# Patient Record
Sex: MALE | Race: WHITE | Employment: UNEMPLOYED | URBAN - METROPOLITAN AREA
[De-identification: names, ages, dates, MRNs, and addresses within clinical notes are randomized per-mention and may not be internally consistent; named-entity substitution may affect disease eponyms.]

---

## 2021-10-19 ENCOUNTER — TELEPHONE (OUTPATIENT)
Dept: FAMILY MEDICINE CLINIC | Age: 41
End: 2021-10-19

## 2021-10-19 NOTE — TELEPHONE ENCOUNTER
----- Message from Serenity Hubbard sent at 10/18/2021  6:17 PM EDT -----  Regarding: New Patient Appointment Request  Loc Goddard needs to be scheduled for a new patient appointment with Dr. Marty Villagomez. He is also experiencing vertigo, weakness, ear popping/ringing and other lingering covid symptoms after having it. He had laryngitis so his throat is still sore but he tested negative for covid 10 days ago. We got disconnected during our call, I tried contacting the patient back but it said there was no service and I got no answer, hopefully someone from the office can help. Thanks!

## 2021-10-27 ENCOUNTER — OFFICE VISIT (OUTPATIENT)
Dept: FAMILY MEDICINE CLINIC | Age: 41
End: 2021-10-27
Payer: COMMERCIAL

## 2021-10-27 VITALS
SYSTOLIC BLOOD PRESSURE: 128 MMHG | HEART RATE: 91 BPM | WEIGHT: 167 LBS | DIASTOLIC BLOOD PRESSURE: 84 MMHG | TEMPERATURE: 97.6 F | HEIGHT: 69 IN | OXYGEN SATURATION: 98 % | BODY MASS INDEX: 24.73 KG/M2

## 2021-10-27 DIAGNOSIS — R11.2 NAUSEA AND VOMITING, INTRACTABILITY OF VOMITING NOT SPECIFIED, UNSPECIFIED VOMITING TYPE: ICD-10-CM

## 2021-10-27 DIAGNOSIS — H93.13 TINNITUS OF BOTH EARS: ICD-10-CM

## 2021-10-27 DIAGNOSIS — R42 DIZZINESS: Primary | ICD-10-CM

## 2021-10-27 DIAGNOSIS — F32.A DEPRESSION, UNSPECIFIED DEPRESSION TYPE: ICD-10-CM

## 2021-10-27 DIAGNOSIS — F41.9 ANXIETY: ICD-10-CM

## 2021-10-27 DIAGNOSIS — Q61.3 POLYCYSTIC KIDNEY DISEASE: ICD-10-CM

## 2021-10-27 PROCEDURE — 99204 OFFICE O/P NEW MOD 45 MIN: CPT | Performed by: NURSE PRACTITIONER

## 2021-10-27 RX ORDER — PREDNISONE 20 MG/1
TABLET ORAL
COMMUNITY
Start: 2021-10-13 | End: 2021-12-29

## 2021-10-27 RX ORDER — MAGNESIUM GLUCONATE 27 MG(500)
TABLET ORAL
COMMUNITY
Start: 2021-09-09 | End: 2022-09-07 | Stop reason: ALTCHOICE

## 2021-10-27 RX ORDER — PSEUDOEPHED/ACETAMINOPH/DIPHEN 30MG-500MG
TABLET ORAL
COMMUNITY
Start: 2021-10-22 | End: 2021-12-29

## 2021-10-27 RX ORDER — FLUTICASONE PROPIONATE 50 MCG
SPRAY, SUSPENSION (ML) NASAL
COMMUNITY
Start: 2021-09-09 | End: 2021-10-27

## 2021-10-27 RX ORDER — FLUTICASONE PROPIONATE 50 MCG
1 SPRAY, SUSPENSION (ML) NASAL DAILY
Qty: 16 G | Refills: 0 | Status: SHIPPED | OUTPATIENT
Start: 2021-10-27 | End: 2021-11-10 | Stop reason: SDUPTHER

## 2021-10-27 RX ORDER — IBUPROFEN 400 MG/1
TABLET ORAL
COMMUNITY
Start: 2021-10-22 | End: 2021-12-29

## 2021-10-27 RX ORDER — MECLIZINE HYDROCHLORIDE 25 MG/1
TABLET ORAL
COMMUNITY
Start: 2021-10-22 | End: 2021-11-09 | Stop reason: SDUPTHER

## 2021-10-27 RX ORDER — LORAZEPAM 0.5 MG/1
TABLET ORAL
COMMUNITY
Start: 2021-10-06 | End: 2021-12-29

## 2021-10-27 RX ORDER — AMOXICILLIN AND CLAVULANATE POTASSIUM 875; 125 MG/1; MG/1
TABLET, FILM COATED ORAL
COMMUNITY
Start: 2021-10-13 | End: 2021-12-01 | Stop reason: ALTCHOICE

## 2021-10-27 RX ORDER — DICYCLOMINE HCL 20 MG
TABLET ORAL
COMMUNITY
Start: 2021-08-26 | End: 2022-09-07 | Stop reason: ALTCHOICE

## 2021-10-27 RX ORDER — CEFDINIR 300 MG/1
CAPSULE ORAL
COMMUNITY
Start: 2021-09-24 | End: 2021-12-01 | Stop reason: ALTCHOICE

## 2021-10-27 RX ORDER — ONDANSETRON 4 MG/1
TABLET, ORALLY DISINTEGRATING ORAL
COMMUNITY
Start: 2021-08-26 | End: 2021-12-01 | Stop reason: ALTCHOICE

## 2021-10-27 SDOH — ECONOMIC STABILITY: FOOD INSECURITY: WITHIN THE PAST 12 MONTHS, THE FOOD YOU BOUGHT JUST DIDN'T LAST AND YOU DIDN'T HAVE MONEY TO GET MORE.: NEVER TRUE

## 2021-10-27 SDOH — ECONOMIC STABILITY: FOOD INSECURITY: WITHIN THE PAST 12 MONTHS, YOU WORRIED THAT YOUR FOOD WOULD RUN OUT BEFORE YOU GOT MONEY TO BUY MORE.: NEVER TRUE

## 2021-10-27 ASSESSMENT — ENCOUNTER SYMPTOMS
RHINORRHEA: 0
NAUSEA: 0
COUGH: 0
BACK PAIN: 0
SHORTNESS OF BREATH: 0
ABDOMINAL PAIN: 0
SORE THROAT: 0
DIARRHEA: 0
VOMITING: 0
WHEEZING: 0

## 2021-10-27 ASSESSMENT — PATIENT HEALTH QUESTIONNAIRE - PHQ9
5. POOR APPETITE OR OVEREATING: 0
SUM OF ALL RESPONSES TO PHQ QUESTIONS 1-9: 19
3. TROUBLE FALLING OR STAYING ASLEEP: 3
8. MOVING OR SPEAKING SO SLOWLY THAT OTHER PEOPLE COULD HAVE NOTICED. OR THE OPPOSITE, BEING SO FIGETY OR RESTLESS THAT YOU HAVE BEEN MOVING AROUND A LOT MORE THAN USUAL: 0
7. TROUBLE CONCENTRATING ON THINGS, SUCH AS READING THE NEWSPAPER OR WATCHING TELEVISION: 2
10. IF YOU CHECKED OFF ANY PROBLEMS, HOW DIFFICULT HAVE THESE PROBLEMS MADE IT FOR YOU TO DO YOUR WORK, TAKE CARE OF THINGS AT HOME, OR GET ALONG WITH OTHER PEOPLE: 2
6. FEELING BAD ABOUT YOURSELF - OR THAT YOU ARE A FAILURE OR HAVE LET YOURSELF OR YOUR FAMILY DOWN: 3
4. FEELING TIRED OR HAVING LITTLE ENERGY: 3
2. FEELING DOWN, DEPRESSED OR HOPELESS: 3
1. LITTLE INTEREST OR PLEASURE IN DOING THINGS: 3
SUM OF ALL RESPONSES TO PHQ9 QUESTIONS 1 & 2: 6
SUM OF ALL RESPONSES TO PHQ QUESTIONS 1-9: 19
SUM OF ALL RESPONSES TO PHQ QUESTIONS 1-9: 17
9. THOUGHTS THAT YOU WOULD BE BETTER OFF DEAD, OR OF HURTING YOURSELF: 2

## 2021-10-27 ASSESSMENT — COLUMBIA-SUICIDE SEVERITY RATING SCALE - C-SSRS
3. HAVE YOU BEEN THINKING ABOUT HOW YOU MIGHT KILL YOURSELF?: YES
6. HAVE YOU EVER DONE ANYTHING, STARTED TO DO ANYTHING, OR PREPARED TO DO ANYTHING TO END YOUR LIFE?: YES
1. WITHIN THE PAST MONTH, HAVE YOU WISHED YOU WERE DEAD OR WISHED YOU COULD GO TO SLEEP AND NOT WAKE UP?: YES
2. HAVE YOU ACTUALLY HAD ANY THOUGHTS OF KILLING YOURSELF?: YES
5. HAVE YOU STARTED TO WORK OUT OR WORKED OUT THE DETAILS OF HOW TO KILL YOURSELF? DO YOU INTEND TO CARRY OUT THIS PLAN?: NO
7. DID THIS OCCUR IN THE LAST THREE MONTHS: NO
4. HAVE YOU HAD THESE THOUGHTS AND HAD SOME INTENTION OF ACTING ON THEM?: YES

## 2021-10-27 ASSESSMENT — SOCIAL DETERMINANTS OF HEALTH (SDOH): HOW HARD IS IT FOR YOU TO PAY FOR THE VERY BASICS LIKE FOOD, HOUSING, MEDICAL CARE, AND HEATING?: SOMEWHAT HARD

## 2021-10-27 NOTE — PATIENT INSTRUCTIONS
Food and Meal Resources    Leodis Senate of 2175 [facility]  Call 211 or  www. Virdia    [employer]. 50 [facility])  Call - [phone]  [email or web address]. Lesson[employer]      [employer] / [employer] on [facility]  [address]  [phone]  *regular & [address]. 1925 [facility], [address]  [phone]  *regular & special diets  60+ [facility] on [address] [place]  [phone], ext. 111 [facility] on 9786 [facility]. [facility], [address] [place] [address]  [phone]  *regular & special diets  [address], [address] and [place] only    51 [facility] on [address] [place] [address]. #[address]  [place], [address] [facility] [address]  [address]  [phone]  [facility] 109 only    [address] on [address]. [place], [address] [facility] [address]  [phone]  61 + and/or disables      Food and Meal Resources    Leodis Senate of [facility] or  www. Virdia    [facility]. 50 [facility])  Call - [phone]  [email or web address]. [employer]      [employer] / [employer] on [facility]  [address]  [phone]  *regular & [address]. 1925 [facility], [address]  [phone]  *regular & special diets  60+ [facility] on [address] [place]  [phone], ext. 111 [facility] on [address] [place] [address].   [place], [address]  [phone]  *regular & special diets  [place], [facility], [place] and [place] only    [facility] on [address] [facility]. #[address]  [place], [address]  401 [facility]. 100 [facility], [address] [place] [address] Road  [phone]  [address] 109 only    [address] on [address]. [place]. [place], [address]  [phone]  61 + and/or disables      Family and Housing Resources    [address] [place] [address] Drive of [address] [place] [address]  Call 211  or - www. [facility]cast[facility] Center[facility] [address] [place] [address])  Call - [phone]  www."[email or web address]"    [address]  [facility], [address] [place] [address], [address] [place] [address]  [phone] /[phone]    Medicaid Application  [email or web address] [email or web address]  [phone]    Home Energy Assistance Programs (HEAP)  [facility] Sendy Carrillos. [facility], 1001 [facility]  [phone]    [facility] ( jail)  [address] [facility] [address], 1850 [facility] (jail)  [address], [address]  [address] [facility]  www. Vartopia    [employer]  [address] [place] [address], [address]  [address] [facility] [employer]  [address] Ven[address] [address] 3735 [facility] at 3[address] [facility]

## 2021-10-27 NOTE — PROGRESS NOTES
[address] [facility] [address] [facility]  [address] [place] [address]  Dept: [phone]  Dept Fax: [phone]  Loc: [phone]     Subjective     Carey Spencer 37 y.o. male presents 10/27/21 with   Chief Complaint   Patient presents with    New Patient    [facility] from [facility], vertigo, cysts on kidneys and lungs, malabsorption, blood in stool    Depression     POSITIVE SCREENING       HPI     Patient presents to establish care, states he needs a PCP. Admits he was recently diagnosed with vertigo. Dizziness, has constant ringing in his ears, pressure in his head. Diagnosed in August. Wakes up dizzy, fatigued, light hurts his eyes, ears ringing, head feels like it is being squeezed. Was given Meclizine this helps slightly, still with ringing and pressure, also with some blurry vision. Has been tested for COVID several times. Was set up to see ENT, but for some reason this did not happen. Also with some GI issues, nauseas and vomiting, has been taking bentyl, magnesium, and zofran which has been helping him. Does have anxiety and depression, no active SI/HI, these thoughts cross his mind occasionally, smokes marijuana occasionally. States things got really bad when COVID hit, lost his job, lost the house, lost fiance, son moved out. Would like to get his mental health right. Had occasional panic attacks but not often. Patient now works as a  (which is a source of stress), but has not been able to due to vertigo. Thinks this job may give him an aspect of PTSD. Also had a rough childhood, thinks he might have PTSD from this. Has a counseling appt set up with [facility]. Was on Augmentin a week ago for sore throat. States he passed out at work once, felt lightheaded and dizzy. No other drugs or alcohol.     Reviewed the following history:    Past Medical History:   Diagnosis Date    Vertigo History reviewed. No pertinent surgical history. Family History   Problem Relation Age of Onset    Alcohol Abuse Father        Allergies   Allergen Reactions    Penicillins Anaphylaxis and Nausea And Vomiting       Current Outpatient Medications on File Prior to Visit   Medication Sig Dispense Refill    meclizine (ANTIVERT) 25 MG tablet take 1 tablet by mouth three times a day      dicyclomine (BENTYL) 20 MG tablet take 1 tablet by mouth four times a day if needed      polyethylene glycol (GOLYTELY) 236 g solution Take by mouth      cefdinir (OMNICEF) 300 MG capsule Take by mouth      ACETAMINOPHEN EXTRA STRENGTH 500 MG tablet take 2 tablets by mouth three times a day (Patient not taking: Reported on 10/27/2021)      ondansetron (ZOFRAN-ODT) 4 MG disintegrating tablet dissolve 1 tablet ON TONGUE every 6 hours if needed (Patient not taking: Reported on 10/27/2021)      magnesium gluconate (MAGONATE) 500 MG tablet Take by mouth (Patient not taking: Reported on 10/27/2021)      predniSONE (DELTASONE) 20 MG tablet take 3 tablets by mouth daily for 3 days then 2 tablets daily for. ..  (REFER TO PRESCRIPTION NOTES). (Patient not taking: Reported on 10/27/2021)      LORazepam (ATIVAN) 0.5 MG tablet take 1 tablet by mouth three times a day if needed for anxiety (Patient not taking: Reported on 10/27/2021)      ibuprofen (ADVIL;MOTRIN) 400 MG tablet take 1 tablet by mouth three times a day (Patient not taking: Reported on 10/27/2021)      amoxicillin-clavulanate (AUGMENTIN) 875-125 MG per tablet take 1 tablet by mouth twice a day for 10 days       No current facility-administered medications on file prior to visit. Review of Systems   Constitutional: Negative for chills and fever. HENT: Negative for congestion, rhinorrhea and sore throat. Head pressure and ringing in ears   Respiratory: Negative for cough, shortness of breath and wheezing. Cardiovascular: Negative for chest pain. Gastrointestinal: Negative for abdominal pain, diarrhea, nausea and vomiting. Musculoskeletal: Negative for back pain and myalgias. Skin: Negative for rash. Neurological: Positive for dizziness. Psychiatric/Behavioral: The patient is nervous/anxious. Objective    Vitals:    10/27/21 1025   BP: 128/84   Pulse: 91   Temp: 97.6 °F (36.4 °C)   TempSrc: Infrared   SpO2: 98%   Weight: 167 lb (75.8 kg)   Height: 5' 9\" (1.753 m)       Physical Exam  Constitutional:       General: He is not in acute distress. Appearance: Normal appearance. He is not ill-appearing or toxic-appearing. HENT:      Head: Normocephalic and atraumatic. Right Ear: Hearing and external ear normal.      Left Ear: Hearing and external ear normal.   Eyes:      General: Lids are normal.      Conjunctiva/sclera: Conjunctivae normal.      Pupils: Pupils are equal, round, and reactive to light. Cardiovascular:      Rate and Rhythm: Normal rate and regular rhythm. Heart sounds: Normal heart sounds. Pulmonary:      Effort: Pulmonary effort is normal. No respiratory distress. Breath sounds: Normal breath sounds. No decreased breath sounds, wheezing, rhonchi or rales. Musculoskeletal:      Cervical back: Normal range of motion and neck supple. Skin:     General: Skin is warm and dry. Neurological:      General: No focal deficit present. Mental Status: He is alert and oriented to person, place, and time. Cranial Nerves: Cranial nerves are intact. Sensory: Sensation is intact. Motor: Motor function is intact. Coordination: Coordination is intact. Gait: Gait is intact. Psychiatric:         Attention and Perception: Attention normal.         Mood and Affect: Mood normal.         Speech: Speech normal.         Behavior: Behavior normal.         Thought Content:  Thought content normal.         Cognition and Memory: Cognition normal.         Judgment: Judgment normal.       POC Testing Today: No results found for this visit on 10/27/21. Assessment and Plan    Cedric Diamond 37 y.o. male presenting for establish care, dizziness, anxiety/depression    1. Dizziness  - Patient states he was diagnosed with vertigo recently. - Ambulatory referral to Neurology  - Ambulatory referral to Physical Therapy    2. Tinnitus of both ears  - Ambulatory referral to Neurology  - fluticasone (FLONASE) 50 MCG/ACT nasal spray; 1 spray by Each Nostril route daily  Dispense: 16 g; Refill: 0    3. Depression, unspecified depression type  - sertraline (ZOLOFT) 50 MG tablet; Take 1 tablet by mouth daily  Dispense: 30 tablet; Refill: 0    4. Anxiety  - sertraline (ZOLOFT) 50 MG tablet; Take 1 tablet by mouth daily  Dispense: 30 tablet; Refill: 0    5. Polycystic kidney disease  - Amb External Referral To Nephrology    6. Nausea and vomiting, intractability of vomiting not specified, unspecified vomiting type  - Ambulatory referral to Gastroenterology        Return in about 4 weeks (around 11/24/2021) for follow up, PRN for any acute conditions. Side effects and adverse effects of any medication prescribed today, as well as treatment plan/rationale, follow-up care, and result expectations have been discussed with the patient. Expresses understanding and desires to proceed with treatment plan. Discussed signs and symptoms which require immediate follow-up in ED/call to 911. Understanding verbalized. I have reviewed and updated the electronic medical record.     Samson Lee, APRN - CNP

## 2021-11-09 ENCOUNTER — HOSPITAL ENCOUNTER (OUTPATIENT)
Dept: PHYSICAL THERAPY | Age: 41
Setting detail: THERAPIES SERIES
Discharge: HOME | End: 2021-11-09
Payer: COMMERCIAL

## 2021-11-09 DIAGNOSIS — H93.13 TINNITUS OF BOTH EARS: ICD-10-CM

## 2021-11-09 PROCEDURE — 97112 NEUROMUSCULAR REEDUCATION: CPT

## 2021-11-09 PROCEDURE — 97163 PT EVAL HIGH COMPLEX 45 MIN: CPT

## 2021-11-09 PROCEDURE — 97530 THERAPEUTIC ACTIVITIES: CPT

## 2021-11-09 NOTE — TELEPHONE ENCOUNTER
Comments: Last filled 10/22/21    Last Office Visit (last PCP visit):   10/27/2021    Next Visit Date:  Future Appointments   Date Time Provider London Hernandez   2021 12:00 PM Homero Hamlin   2021 12:00 PM Homero Hamlin   2021 10:00 AM Bing Melo, APRN - [address] [facility]   2021 10:30 AM Fabienne Atkins [facility]       **If hasn't been seen in over a year OR hasn't followed up according to last diabetes/ADHD visit, make appointment for patient before sending refill to provider.     Rx requested:  Requested Prescriptions     Pending Prescriptions Disp Refills    meclizine (ANTIVERT) 25 MG tablet      fluticasone (FLONASE) 50 MCG/ACT nasal spray 16 g 0     Si spray by Each Nostril route daily
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
## 2021-11-10 RX ORDER — MECLIZINE HYDROCHLORIDE 25 MG/1
TABLET ORAL
Qty: 90 TABLET | Refills: 0 | Status: SHIPPED | OUTPATIENT
Start: 2021-11-10 | End: 2021-12-29 | Stop reason: ALTCHOICE

## 2021-11-10 RX ORDER — FLUTICASONE PROPIONATE 50 MCG
1 SPRAY, SUSPENSION (ML) NASAL DAILY
Qty: 16 G | Refills: 0 | Status: SHIPPED | OUTPATIENT
Start: 2021-11-10

## 2021-11-12 ENCOUNTER — TELEPHONE (OUTPATIENT)
Dept: FAMILY MEDICINE CLINIC | Age: 41
End: 2021-11-12

## 2021-11-12 DIAGNOSIS — H53.8 BLURRY VISION: Primary | ICD-10-CM

## 2021-11-12 DIAGNOSIS — R11.2 NAUSEA AND VOMITING, INTRACTABILITY OF VOMITING NOT SPECIFIED, UNSPECIFIED VOMITING TYPE: Primary | ICD-10-CM

## 2021-11-12 RX ORDER — ONDANSETRON 4 MG/1
4 TABLET, ORALLY DISINTEGRATING ORAL 3 TIMES DAILY PRN
Qty: 21 TABLET | Refills: 0 | Status: SHIPPED | OUTPATIENT
Start: 2021-11-12

## 2021-11-12 RX ORDER — ONDANSETRON 4 MG/1
TABLET, ORALLY DISINTEGRATING ORAL
OUTPATIENT
Start: 2021-11-12

## 2021-11-12 NOTE — TELEPHONE ENCOUNTER
----- Message from Reyna Moody sent at 11/11/2021  4:46 PM EST -----  Subject: Message to Provider    QUESTIONS  Information for Provider? The patient was just wanting to know if he could   be prescribe something for the ringing in his ears its causing him to not   be able to sleep. He said his malabsorption is also causing nausea and   vertigo as well. Requesting call back when something is sent in or if you   have any questions  ---------------------------------------------------------------------------  --------------  CALL BACK INFO  What is the best way for the office to contact you? OK to leave message on   voicemail  Preferred Call Back Phone Number? [phone]  ---------------------------------------------------------------------------  --------------  SCRIPT ANSWERS  Relationship to Patient?  Self

## 2021-11-12 NOTE — TELEPHONE ENCOUNTER
----- Message from Jaden Li sent at 11/11/2021  4:49 PM EST -----  Subject: Referral Request    QUESTIONS   Reason for referral request? ophthalmologist   Has the physician seen you for this condition before? No   Preferred Specialist (if applicable)? Do you already have an appointment scheduled? No  Additional Information for Provider? The patient said the last time he   went to physical therapy that they recommended going to [facility]berto eye   specialist.  ---------------------------------------------------------------------------  --------------  [address]  What is the best way for the office to contact you? OK to leave message on   voicemail  Preferred Call Back Phone Number?  [phone]

## 2021-11-12 NOTE — TELEPHONE ENCOUNTER
----- Message from Zeus Gustafson sent at 11/11/2021  4:47 PM EST -----  Subject: Refill Request    QUESTIONS  Name of Medication? ondansetron (ZOFRAN-ODT) 4 MG disintegrating tablet  Patient-reported dosage and instructions? 3 times a day or every 6 hours  How many days do you have left? 3  Preferred Pharmacy? [facility] [place] [address] Pharmacy phone number (if available)? [phone]  ---------------------------------------------------------------------------  --------------  Sean PUENTE  What is the best way for the office to contact you? OK to leave message on   voicemail  Preferred Call Back Phone Number?  [phone]

## 2021-11-12 NOTE — TELEPHONE ENCOUNTER
Comments: 8/26/2021    Last Office Visit (last PCP visit):   10/27/2021    Next Visit Date:  Future Appointments   Date Time Provider London Hernandez   11/16/2021 12:00 PM Homero Hamlin   11/18/2021 12:00 PM Homero Hamlin   11/24/2021 10:00 AM Frances Parnell, APRN - [address] [facility]   12/7/2021 10:30 AM Taylor Rios MD [facility]       **If hasn't been seen in over a year OR hasn't followed up according to last diabetes/ADHD visit, make appointment for patient before sending refill to provider.     Rx requested:  Requested Prescriptions     Pending Prescriptions Disp Refills    ondansetron (ZOFRAN-ODT) 4 MG disintegrating tablet       Sig: dissolve 1 tablet ON TONGUE every 6 hours if needed

## 2021-11-12 NOTE — TELEPHONE ENCOUNTER
The patient was just wanting to know if he could   be prescribe something for the ringing in his ears its causing him to not   be able to sleep. He said his malabsorption is also causing nausea and   vertigo as well.  Requesting call back when something is sent in or if you   have any questions

## 2021-11-15 NOTE — TELEPHONE ENCOUNTER
I sent in Zofran for the nausea. There is not really a medication that has been effective for ringing, I would follow up with neurology or ENT.

## 2021-11-16 ENCOUNTER — HOSPITAL ENCOUNTER (OUTPATIENT)
Dept: PHYSICAL THERAPY | Age: 41
Setting detail: THERAPIES SERIES
Discharge: HOME | End: 2021-11-16
Payer: COMMERCIAL

## 2021-11-16 PROCEDURE — 97112 NEUROMUSCULAR REEDUCATION: CPT

## 2021-11-16 PROCEDURE — 97140 MANUAL THERAPY 1/> REGIONS: CPT

## 2021-11-16 PROCEDURE — 97530 THERAPEUTIC ACTIVITIES: CPT
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
## 2021-11-29 DIAGNOSIS — F32.A DEPRESSION, UNSPECIFIED DEPRESSION TYPE: ICD-10-CM

## 2021-11-29 DIAGNOSIS — F41.9 ANXIETY: ICD-10-CM

## 2021-11-29 NOTE — TELEPHONE ENCOUNTER
Comments:     Last Office Visit (last PCP visit):   10/27/2021    Next Visit Date:  Future Appointments   Date Time Provider London Hernandez   12/7/2021 10:30 AM Danica Prakash MD [facility]       **If hasn't been seen in over a year OR hasn't followed up according to last diabetes/ADHD visit, make appointment for patient before sending refill to provider.     Rx requested:  Requested Prescriptions     Pending Prescriptions Disp Refills    sertraline (ZOLOFT) 50 MG tablet 30 tablet 0     Sig: Take 1 tablet by mouth daily

## 2021-11-29 NOTE — TELEPHONE ENCOUNTER
----- Message from Ramandeep Richey sent at 11/26/2021  3:59 PM EST -----  Subject: Refill Request    QUESTIONS  Name of Medication? sertraline (ZOLOFT) 50 MG tablet  Patient-reported dosage and instructions? once daily  How many days do you have left? 1  Preferred Pharmacy? [facility] [place] [address] Pharmacy phone number (if available)? [phone]  ---------------------------------------------------------------------------  --------------  Warden Kaden PUENTE  What is the best way for the office to contact you? OK to leave message on   voicemail  Preferred Call Back Phone Number?  [phone]

## 2021-11-30 ENCOUNTER — NURSE TRIAGE (OUTPATIENT)
Dept: OTHER | Facility: CLINIC | Age: 41
End: 2021-11-30

## 2021-11-30 NOTE — TELEPHONE ENCOUNTER
Received call from [employer]i [employer] at [facility], caller not on line. Complaint: vertigo x 1 year    Market: [id number] [facility] Name: [address] [facility]    Caller's telephone number verified as [phone]    Unsuccessful attempt to re-connect with caller via phone, left message for return call to office            Reason for Disposition   Message left on unidentified voice mail. Phone number verified.     Protocols used: NO CONTACT OR DUPLICATE CONTACT CALL-ADULT-[place]

## 2021-11-30 NOTE — TELEPHONE ENCOUNTER
Received call from sahara at [facility] with Red Flag Complaint. Brief description of triage: dizziness    Triage indicates for patient to follow up with PCP within 3 days. If no appts avail advised pt to go to [facility]. Pt verbalized understanding and agrees to disposition. Care advice provided, patient verbalizes understanding; denies any other questions or concerns; instructed to call back for any new or worsening symptoms. Writer provided warm transfer to April at [facility] for appointment scheduling. Attention Provider: Thank you for allowing me to participate in the care of your patient. The patient was connected to triage in response to information provided to the [facility]/PSC. Please do not respond through this encounter as the response is not directed to a shared pool. Reason for Disposition   [1] MODERATE dizziness (e.g., vertigo; feels very unsteady, interferes with normal activities) AND [2] has been evaluated by physician for this    Answer Assessment - Initial Assessment Questions  1. DESCRIPTION: \"Describe your dizziness. \"      Seeing doubles, states the ground is moving. 2. VERTIGO: \"Do you feel like either you or the room is spinning or tilting? \"       The ground is moving    3. LIGHTHEADED: \"Do you feel lightheaded? \" (e.g., somewhat faint, woozy, weak upon standing)      Weak upon standing, unsteady    4. SEVERITY: \"How bad is it? \"  \"Can you walk? \"    - MILD - Feels unsteady but walking normally. - MODERATE - Feels very unsteady when walking, but not falling; interferes with normal activities (e.g., school, work) . - SEVERE - Unable to walk without falling (requires assistance). Moderate    5. ONSET:  \"When did the dizziness begin? \"      Started a year    6. AGGRAVATING FACTORS: \"Does anything make it worse? \" (e.g., standing, change in head position)      Laying down makes it worse, changing position. While walking around it can \"hit\" me at anytime.     7. CAUSE: \"What do you think is causing the dizziness? \"      Inner ear infection    8. RECURRENT SYMPTOM: \"Have you had dizziness before? \" If so, ask: \"When was the last time? \" \"What happened that time? \"      A year ago    9. OTHER SYMPTOMS: \"Do you have any other symptoms? \" (e.g., headache, weakness, numbness, vomiting, earache)      Nausea, fatigue, unsteady, pain, ringing in ears, double vision, eye strain     10. PREGNANCY: \"Is there any chance you are pregnant? \" \"When was your last menstrual period? \"        na    Protocols used: DIZZINESS - VERTIGO-ADULT-AH

## 2021-12-01 ENCOUNTER — OFFICE VISIT (OUTPATIENT)
Dept: FAMILY MEDICINE CLINIC | Age: 41
End: 2021-12-01
Payer: COMMERCIAL

## 2021-12-01 VITALS
SYSTOLIC BLOOD PRESSURE: 122 MMHG | HEART RATE: 75 BPM | WEIGHT: 168.2 LBS | DIASTOLIC BLOOD PRESSURE: 80 MMHG | HEIGHT: 69 IN | TEMPERATURE: 97.1 F | OXYGEN SATURATION: 98 % | BODY MASS INDEX: 24.91 KG/M2

## 2021-12-01 DIAGNOSIS — H66.003 ACUTE SUPPURATIVE OTITIS MEDIA OF BOTH EARS WITHOUT SPONTANEOUS RUPTURE OF TYMPANIC MEMBRANES, RECURRENCE NOT SPECIFIED: Primary | ICD-10-CM

## 2021-12-01 DIAGNOSIS — H93.13 TINNITUS OF BOTH EARS: ICD-10-CM

## 2021-12-01 DIAGNOSIS — H81.10 BENIGN PAROXYSMAL POSITIONAL VERTIGO, UNSPECIFIED LATERALITY: ICD-10-CM

## 2021-12-01 PROCEDURE — G8484 FLU IMMUNIZE NO ADMIN: HCPCS | Performed by: NURSE PRACTITIONER

## 2021-12-01 PROCEDURE — G8427 DOCREV CUR MEDS BY ELIG CLIN: HCPCS | Performed by: NURSE PRACTITIONER

## 2021-12-01 PROCEDURE — G8420 CALC BMI NORM PARAMETERS: HCPCS | Performed by: NURSE PRACTITIONER

## 2021-12-01 PROCEDURE — 99214 OFFICE O/P EST MOD 30 MIN: CPT | Performed by: NURSE PRACTITIONER

## 2021-12-01 PROCEDURE — 4004F PT TOBACCO SCREEN RCVD TLK: CPT | Performed by: NURSE PRACTITIONER

## 2021-12-01 RX ORDER — AMOXICILLIN AND CLAVULANATE POTASSIUM 400; 57 MG/5ML; MG/5ML
875 POWDER, FOR SUSPENSION ORAL 2 TIMES DAILY
Qty: 218 ML | Refills: 0 | Status: SHIPPED | OUTPATIENT
Start: 2021-12-01 | End: 2021-12-11

## 2021-12-01 NOTE — PROGRESS NOTES
[address] [facility]  [address] [place] [id number]  Dept: [phone]  Dept Fax: [phone]  Loc: [phone]     Subjective     Lorelei Alvarez 37 y.o. male presents 12/1/21 with   Chief Complaint   Patient presents with    Dizziness    Eye Pain    Fatigue    Tinnitus    Headache       HPI     Patient here for follow up. Last visit 10/27. Patient has had ongoing dizziness and tinnitus. Patient was given referral to neurology and PT at last visit, has yet to schedule with neurology. States ears are ringing 24/7, has had pressure in head with lying down, pressure when turning his head to the left. Head pressure was bad last night, took motrin, which helped the headache but not the ringing. Has had constant vertigo, did PT a couple times with no relief. Vision is occasionally blotchy, sees doubles if staring at something, was given a referral to ophthalmology recently, which he has yet to schedule an appointment. Symptoms have been very bad, he has been unable to work as he is a . Fam hx: father had a stroke, young cousin had stroke at age 22    Here with mother today who would like \"all the tests ran on him today to figure out what is going on\". Patient had been seen in [facility] ED several times.      Saw Dr. Shree Luque (neurology) through [facility] on 59:  52-year-old male who presented to [facility] emergency room on August 26 with the complaints of abdominal pain fatigue, sinus drainage and syncopal-like events   Has had the symptoms for about 1 year   He denies any history of epilepsy or other neurologic problems   Did undergo a CAT scan of the brain which was unremarkable and CAT scan of the abdomen which possibly was indicative of polycystic kidney disease   Other blood work was normal   Believe he should be evaluated initially by GI and/or ENT   History is probably not compatible with a primary neurologic process    Patient also given referral to nephrology at last visit for possible polycystic kidney disease, patient has yet to schedule an appointment. Also reported N/V and episodes of rectal bleeding at last visit, given referral to GI, has yet to schedule an appointment. Patient is curious how he could file for disability and also asking how he could change the amount of child support he is required to pay given the fact that he has been unable to work. Reviewed the following history:    Past Medical History:   Diagnosis Date    Vertigo      No past surgical history on file. Family History   Problem Relation Age of Onset    Alcohol Abuse Father        Allergies   Allergen Reactions    Penicillins Anaphylaxis and Nausea And Vomiting       Current Outpatient Medications on File Prior to Visit   Medication Sig Dispense Refill    sertraline (ZOLOFT) 50 MG tablet Take 1 tablet by mouth daily 30 tablet 0    ondansetron (ZOFRAN-ODT) 4 MG disintegrating tablet Place 1 tablet under the tongue 3 times daily as needed for Nausea or Vomiting 21 tablet 0    meclizine (ANTIVERT) 25 MG tablet take 1 tablet by mouth three times a day 90 tablet 0    fluticasone (FLONASE) 50 MCG/ACT nasal spray 1 spray by Each Nostril route daily 16 g 0    dicyclomine (BENTYL) 20 MG tablet take 1 tablet by mouth four times a day if needed      polyethylene glycol (GOLYTELY) 236 g solution Take by mouth      ACETAMINOPHEN EXTRA STRENGTH 500 MG tablet take 2 tablets by mouth three times a day (Patient not taking: Reported on 10/27/2021)      magnesium gluconate (MAGONATE) 500 MG tablet Take by mouth (Patient not taking: Reported on 10/27/2021)      predniSONE (DELTASONE) 20 MG tablet take 3 tablets by mouth daily for 3 days then 2 tablets daily for. ..  (REFER TO PRESCRIPTION NOTES).  (Patient not taking: Reported on 10/27/2021)      LORazepam (ATIVAN) 0.5 MG tablet take 1 tablet by mouth three times a day if needed for anxiety (Patient not taking: Reported on 10/27/2021)      ibuprofen (ADVIL;MOTRIN) 400 MG tablet take 1 tablet by mouth three times a day (Patient not taking: Reported on 10/27/2021)       No current facility-administered medications on file prior to visit. Review of Systems   Constitutional: Negative for chills and fever. HENT:        Head pressure   Eyes: Positive for visual disturbance. Respiratory: Negative for cough. Cardiovascular: Negative for chest pain. Neurological: Positive for dizziness. Tinnitus       Objective    Vitals:    12/01/21 1332   BP: 122/80   Site: Right Upper Arm   Position: Sitting   Cuff Size: Medium Adult   Pulse: 75   Temp: 97.1 °F (36.2 °C)   TempSrc: Infrared   SpO2: 98%   Weight: 168 lb 3.2 oz (76.3 kg)   Height: 5' 9\" (1.753 m)       Physical Exam  Constitutional:       General: He is not in acute distress. Appearance: Normal appearance. He is not ill-appearing or toxic-appearing. HENT:      Head: Normocephalic and atraumatic. Right Ear: Hearing and external ear normal. A middle ear effusion is present. Tympanic membrane is erythematous and bulging. Left Ear: Hearing and external ear normal. A middle ear effusion is present. Tympanic membrane is erythematous and bulging. Eyes:      General: Lids are normal.      Conjunctiva/sclera: Conjunctivae normal.      Pupils: Pupils are equal, round, and reactive to light. Cardiovascular:      Rate and Rhythm: Normal rate and regular rhythm. Heart sounds: Normal heart sounds. Pulmonary:      Effort: Pulmonary effort is normal. No respiratory distress. Breath sounds: Normal breath sounds. No decreased breath sounds, wheezing, rhonchi or rales. Musculoskeletal:      Cervical back: Normal range of motion and neck supple. Skin:     General: Skin is warm and dry. Neurological:      General: No focal deficit present. Mental Status: He is alert and oriented to person, place, and time. symptoms of illness, follow up appointment should be made in a timely fashion with a physician. Discussed signs and symptoms which require immediate follow-up in ED/call to 911. Understanding verbalized. I have reviewed and updated the electronic medical record.     ZAY Rodriguez - CNP

## 2021-12-02 PROBLEM — R91.1 LUNG NODULE: Status: ACTIVE | Noted: 2021-12-02

## 2021-12-02 PROBLEM — F41.8 MIXED ANXIETY DEPRESSIVE DISORDER: Status: ACTIVE | Noted: 2021-12-02

## 2021-12-02 PROBLEM — F99 PSYCHIATRIC SYMPTOMS: Status: ACTIVE | Noted: 2021-12-02

## 2021-12-02 PROBLEM — H81.10 BENIGN PAROXYSMAL POSITIONAL VERTIGO: Status: ACTIVE | Noted: 2021-12-02

## 2021-12-02 PROBLEM — J02.9 VIRAL PHARYNGITIS: Status: ACTIVE | Noted: 2021-12-02

## 2021-12-02 PROBLEM — K21.9 GASTROESOPHAGEAL REFLUX DISEASE: Status: ACTIVE | Noted: 2021-12-02

## 2021-12-02 PROBLEM — K92.1 HEMATOCHEZIA: Status: ACTIVE | Noted: 2021-12-02

## 2021-12-02 PROBLEM — R11.2 NAUSEA AND VOMITING: Status: ACTIVE | Noted: 2021-12-02

## 2021-12-02 PROBLEM — R10.9 ABDOMINAL PAIN: Status: ACTIVE | Noted: 2021-12-02

## 2021-12-07 ENCOUNTER — OFFICE VISIT (OUTPATIENT)
Dept: GASTROENTEROLOGY | Age: 41
End: 2021-12-07
Payer: COMMERCIAL

## 2021-12-07 VITALS
RESPIRATION RATE: 12 BRPM | SYSTOLIC BLOOD PRESSURE: 122 MMHG | BODY MASS INDEX: 25.1 KG/M2 | OXYGEN SATURATION: 98 % | DIASTOLIC BLOOD PRESSURE: 76 MMHG | HEART RATE: 86 BPM | WEIGHT: 170 LBS

## 2021-12-07 DIAGNOSIS — R11.2 NAUSEA AND VOMITING, INTRACTABILITY OF VOMITING NOT SPECIFIED, UNSPECIFIED VOMITING TYPE: Primary | ICD-10-CM

## 2021-12-07 DIAGNOSIS — K62.5 BLOOD PER RECTUM: ICD-10-CM

## 2021-12-07 PROCEDURE — G8419 CALC BMI OUT NRM PARAM NOF/U: HCPCS | Performed by: INTERNAL MEDICINE

## 2021-12-07 PROCEDURE — G8427 DOCREV CUR MEDS BY ELIG CLIN: HCPCS | Performed by: INTERNAL MEDICINE

## 2021-12-07 PROCEDURE — 99204 OFFICE O/P NEW MOD 45 MIN: CPT | Performed by: INTERNAL MEDICINE

## 2021-12-07 PROCEDURE — G8484 FLU IMMUNIZE NO ADMIN: HCPCS | Performed by: INTERNAL MEDICINE

## 2021-12-07 PROCEDURE — 4004F PT TOBACCO SCREEN RCVD TLK: CPT | Performed by: INTERNAL MEDICINE

## 2021-12-07 RX ORDER — OMEPRAZOLE 40 MG/1
40 CAPSULE, DELAYED RELEASE ORAL
Qty: 90 CAPSULE | Refills: 1 | Status: SHIPPED | OUTPATIENT
Start: 2021-12-07 | End: 2022-09-07 | Stop reason: ALTCHOICE

## 2021-12-07 ASSESSMENT — ENCOUNTER SYMPTOMS
EYE PAIN: 0
TROUBLE SWALLOWING: 0
COLOR CHANGE: 0
VOICE CHANGE: 0
ABDOMINAL DISTENTION: 0
CONSTIPATION: 0
CHEST TIGHTNESS: 0
DIARRHEA: 0
NAUSEA: 0
PHOTOPHOBIA: 0
RECTAL PAIN: 0
SHORTNESS OF BREATH: 0
WHEEZING: 0
EYE REDNESS: 0
VOMITING: 0
BLOOD IN STOOL: 1
ABDOMINAL PAIN: 0

## 2021-12-07 NOTE — PROGRESS NOTES
Subjective:      Patient ID: Ginette Young is a 37 y.o. male who presents today for:  Chief Complaint   Patient presents with    Emesis    Rectal Bleeding       HPI  This is a very pleasant 60-year-old who came in today for the evaluation management of blood per rectum as well as nausea and vomiting. Patient mentioned that he noticed intermittent blood per rectum specially with straining. No previous colonoscopy. No associated change of bowel movement in terms of diarrhea, constipation. No nocturnal progressive abdominal pain. Patient does also endorse episodes of nausea vomiting mainly in the morning. He does take ibuprofen for headache. Patient is currently undergoing work-up for tinnitus and vertigo. Given the episode blood per rectum and vomiting, patient came in today for further evaluation management. Patient denies any fever chills. No associated weight loss. Does report intermittent pain and point toward the epigastrium. Also mentioned that sometimes had difficulty swallowing however symptoms are intermittent in nature. No family history of inflammatory bowel disease or CRC. Past Medical History:   Diagnosis Date    Vertigo      No past surgical history on file.   Social History     Socioeconomic History    Marital status:      Spouse name: Not on file    Number of children: Not on file    Years of education: Not on file    Highest education level: Not on file   Occupational History    Not on file   Tobacco Use    Smoking status: Current Every Day Smoker     Packs/day: 1.00     Years: 20.00     Pack years: 20.00     Types: Cigarettes     Start date: 10/27/1995    Smokeless tobacco: Never Used   Vaping Use    Vaping Use: Never used   Substance and Sexual Activity    Alcohol use: Never    Drug use: Yes     Types: Marijuana Funk Margarita)    Sexual activity: Not on file   Other Topics Concern    Not on file   Social History Narrative    Not on file     Social Determinants of Health     Financial Resource Strain: Medium Risk    Difficulty of Paying Living Expenses: Somewhat hard   Food Insecurity: No Food Insecurity    Worried About Running Out of Food in the Last Year: Never true    Ran Out of Food in the Last Year: Never true   Transportation Needs:     Lack of Transportation (Medical): Not on file    Lack of Transportation (Non-Medical): Not on file   Physical Activity:     Days of Exercise per Week: Not on file    Minutes of Exercise per Session: Not on file   Stress:     Feeling of Stress : Not on file   Social Connections:     Frequency of Communication with Friends and Family: Not on file    Frequency of Social Gatherings with Friends and Family: Not on file    Attends Yazdanism Services: Not on file    Active Member of [address] [place] [address] [employer] or Organizations: Not on file    Attends Club or Organization Meetings: Not on file    Marital Status: Not on file   Intimate Partner Violence:     Fear of Current or Ex-Partner: Not on file    Emotionally Abused: Not on file    Physically Abused: Not on file    Sexually Abused: Not on file   Housing Stability:     Unable to Pay for Housing in the Last Year: Not on file    Number of [place] in the Last Year: Not on file    Unstable Housing in the Last Year: Not on file     Family History   Problem Relation Age of Onset    Alcohol Abuse Father      Allergies   Allergen Reactions    Penicillins Anaphylaxis and Nausea And Vomiting         Review of Systems   Constitutional: Negative for appetite change, chills, fatigue, fever and unexpected weight change. HENT: Negative for nosebleeds, tinnitus, trouble swallowing and voice change. Eyes: Negative for photophobia, pain and redness. Respiratory: Negative for chest tightness, shortness of breath and wheezing. Cardiovascular: Negative for chest pain, palpitations and leg swelling. Gastrointestinal: Positive for blood in stool.  Negative for abdominal distention, abdominal pain, constipation, diarrhea, nausea, rectal pain and vomiting. Endocrine: Negative for polydipsia, polyphagia and polyuria. Genitourinary: Negative for difficulty urinating and hematuria. Skin: Negative for color change, pallor and rash. Neurological: Negative for dizziness, speech difficulty and headaches. Psychiatric/Behavioral: Negative for confusion and suicidal ideas. Objective:   /76 (Site: Right Upper Arm, Position: Sitting, Cuff Size: Large Adult)   Pulse 86   Resp 12   Wt 170 lb (77.1 kg)   SpO2 98%   BMI 25.10 kg/m²     Physical Exam  Constitutional:       General: He is not in acute distress. Appearance: He is well-developed. HENT:      Head: Normocephalic and atraumatic. Eyes:      Conjunctiva/sclera: Conjunctivae normal.      Pupils: Pupils are equal, round, and reactive to light. Cardiovascular:      Rate and Rhythm: Normal rate and regular rhythm. Heart sounds: Normal heart sounds. Pulmonary:      Effort: Pulmonary effort is normal. No respiratory distress. Breath sounds: Normal breath sounds. No wheezing or rales. Abdominal:      General: Bowel sounds are normal. There is no distension. Palpations: Abdomen is soft. Abdomen is not rigid. There is no hepatomegaly, splenomegaly or mass. Tenderness: There is no abdominal tenderness. There is no guarding or rebound. Musculoskeletal:         General: No tenderness or deformity. Normal range of motion. Cervical back: Neck supple. Skin:     Coloration: Skin is not pale. Findings: No erythema or rash. Neurological:      Mental Status: He is alert and oriented to person, place, and time. Laboratory, Pathology, Radiology reviewed in detail with relevantimportant investigations summarized below:  No results found for: WBC, HGB, HCT, MCV, PLT . No results found for: ALT, AST, GGT, ALKPHOS, BILITOT    No results found.   No results found for: IRON, TIBC, FERRITIN  No results found for: INR  No components found for: ACUTEHEPATITISSCREEN  No components found for: CELIACPANEL  No components found for: STOOLCULTURE, C.DIFF, STOOLOVAPARASITE, STOOLLEUCOCYTE        Assessment:    1. Blood per rectum:  Intermittent in nature. Report mainly with straining. No associated diarrhea constipation. Will proceed with colonoscopy. Explained procedure risk and benefit. Patient would like to proceed accordingly  2-intermittent epigastric pain nausea, vomiting in the context of NSAIDs use: We will also obtain EGD for further assessment. Patient also endorses episodes of dysphagia intermittent in nature. We will proceed with EGD with duodenal, gastric and esophageal biopsies  Initiate therapeutic trial with high-dose PPI  3-Associated medical conditions  Include history of vertigo and tinnitus for which patient is currently undergoing evaluation. Return in about 6 weeks (around 1/18/2022) for Post procedure results discussion, further management.       Veda Page MD

## 2021-12-08 ASSESSMENT — ENCOUNTER SYMPTOMS: COUGH: 0

## 2021-12-13 ENCOUNTER — OFFICE VISIT (OUTPATIENT)
Dept: NEUROLOGY | Age: 41
End: 2021-12-13
Payer: COMMERCIAL

## 2021-12-13 ENCOUNTER — HOSPITAL ENCOUNTER (OUTPATIENT)
Dept: PHYSICAL THERAPY | Age: 41
Setting detail: THERAPIES SERIES
End: 2021-12-13
Payer: COMMERCIAL

## 2021-12-13 VITALS
BODY MASS INDEX: 25.1 KG/M2 | WEIGHT: 170 LBS | HEART RATE: 87 BPM | DIASTOLIC BLOOD PRESSURE: 78 MMHG | SYSTOLIC BLOOD PRESSURE: 134 MMHG

## 2021-12-13 DIAGNOSIS — R51.0 HEADACHE DUE TO LOW CEREBROSPINAL FLUID PRESSURE: Primary | ICD-10-CM

## 2021-12-13 PROCEDURE — G8484 FLU IMMUNIZE NO ADMIN: HCPCS

## 2021-12-13 PROCEDURE — G8427 DOCREV CUR MEDS BY ELIG CLIN: HCPCS

## 2021-12-13 PROCEDURE — G8419 CALC BMI OUT NRM PARAM NOF/U: HCPCS

## 2021-12-13 PROCEDURE — 99204 OFFICE O/P NEW MOD 45 MIN: CPT

## 2021-12-13 PROCEDURE — 4004F PT TOBACCO SCREEN RCVD TLK: CPT

## 2021-12-13 NOTE — PROGRESS NOTES
[facility] Neurology Outpatient Consult Note   Name: Salome Pena  Age: 37 y.o. Gender: male  Primary Care Provider: ZAY Leigh CNP        Chief Complaint:New Patient (Pt states that he has tinitits causing ringing in his ears, he says vertigo episodes, double vision, will feel like the room is spinning, light bothers his eyes, he says that he feels like he has a lot of severe pressure in his head, and that the ringing in his ears is constant. He says that he has insomnia, and also a lot of pain in the back of the neck area. He says that medication tried have not done anything. He says he has has bad headaches as well every day with all the other symptoms. ) and Other (Pt went to [place] [id number], and had a ct, and other testing done. )      HPI: New patient referred for 1 year of vertigo episodes, double vision, vertigo, photophobia, pressure sensation/headache and ongoing tinnitus. It is interfering with his function and sleep. He reports his first symptom was daily nausea. This soon followed with 1-2 episodes of diplopia per day. He also reports triplopia. He reports his symptoms are very bad in the morning and late at night. He reports he has tinnitus all the time. Nothing seems to make it better or worse. At times pulsatile. He was told he had bilateral middle ear effusions and is seeing ENT. With regards to his pressure sensation he is nauseous all the time and confirms photophobia. Pressure sensation is bilateral.  It is not positional.  He used to regularly drink caffeinated beverages but only rarely does this now. He is not a daily caffeine drinker. He does not have a clear effect of caffeine on his head pain but thinks it may help slightly. He reports generalized but not focal weakness. He reports that superimposed on his symptoms he is also experiencing a high amount of focus on his symptoms, particularly at night when he has nothing else to concentrate on.  He does have a lot of stressors (he is not able to work, recent relationship breakdown, the pandemic) he is experiencing a lot of anxiety about his symptoms, and this is quite understandable as he feels he does not have answers yet. Is seeing ophthalmology who have done a thorough work-up, he reports, and have not found anything. Family history is notable for strokes at relatively young ages [19s and 35s ]    His symptoms predated a lot of new medications he is on. Specifically the sertraline meclizine and ondansetron. He does regularly use marijuana but this is in small amounts and this use preceded his symptoms by significant period of time. Rare alcohol use. Daily nicotine use.     Patient Active Problem List   Diagnosis    Polycystic kidney disease    Abdominal pain    Benign paroxysmal positional vertigo    Body mass index (BMI) of 20 to 24    Gastroesophageal reflux disease    Hematochezia    Lung nodule    Mixed anxiety depressive disorder    Nausea and vomiting    Psychiatric symptoms    Viral pharyngitis       Past Medical History:   Diagnosis Date    Vertigo        Medications:  Reviewed    Current Outpatient Medications   Medication Sig Dispense Refill    omeprazole (PRILOSEC) 40 MG delayed release capsule Take 1 capsule by mouth every morning (before breakfast) 90 capsule 1    sertraline (ZOLOFT) 50 MG tablet Take 1 tablet by mouth daily 30 tablet 0    ondansetron (ZOFRAN-ODT) 4 MG disintegrating tablet Place 1 tablet under the tongue 3 times daily as needed for Nausea or Vomiting 21 tablet 0    meclizine (ANTIVERT) 25 MG tablet take 1 tablet by mouth three times a day 90 tablet 0    fluticasone (FLONASE) 50 MCG/ACT nasal spray 1 spray by Each Nostril route daily 16 g 0    dicyclomine (BENTYL) 20 MG tablet take 1 tablet by mouth four times a day if needed      magnesium gluconate (MAGONATE) 500 MG tablet Take by mouth       polyethylene glycol (GOLYTELY) 236 g solution Take by mouth      diplopia or nystagmus. Facial sensation was normal to light touch in V1 to V3. Facial strength was symmetric. Normal hearing grossly bilaterally. Palatal raise was symmetric. Shoulder shrug was symmetric. Tongue protrusion was symmetric with no fasciculations. Motor: Pronator drift was absent. Right Left     Shoulder Abduction:  5 5   Elbow Extension 5 5   Elbow Flexion 5 5   Wrist Extension 5 5   Finger Extension 5 5          Right Left   Hip Flexion 5 5   Knee Extension 5 5   Knee Flexion 5 5   Dorsiflexion 5 5   Plantar Flexion 5 5   Negative McArdle sign    Tremor: absent     Tone: Normal in all four limbs. Paratonia in both lower limbs on supine testing but not present on sitting testing    Reflexes:    Right Left   Brachioradialis 1 1   Biceps 1 1   Triceps 1 1   Patella 1 1   Ankle 1 1            Sensory: Globally reduced to pinprick to the forehead. Normal vibration sensation bilaterally. Coordination: Normal finger to nose bilaterally. Negative romberg. Normal gait. Normal tandem gait. With supine to stand delayed transition. Labs:   No results found for: WBC, HGB, HCT, MCV, PLT  No results found for: LABA1C        Radiology:    [unfilled]    Most recent    EEG No valid procedures specified. MRI of Brain No results found for this or any previous visit. No results found for this or any previous visit. MRA of the Head and Neck: No results found for this or any previous visit. No results found for this or any previous visit. No results found for this or any previous visit. CT of the Head: No results found for this or any previous visit. No results found for this or any previous visit. No results found for this or any previous visit. Carotid duplex: No results found for this or any previous visit. No results found for this or any previous visit. No results found for this or any previous visit.       Echo No results found for this or any previous visit. Assessment/Plan:    Chart, labs,and relevant images reviewed. This gentleman is presenting with a number of relatively nonspecific neurologic symptoms and an essentially normal neurologic examination. However he is clearly quite disabled and I think there is a possibility that the explanation for his symptoms is low CSF pressure. What does not fit well with this is the fact that he is not better supine. However the tinnitus transient visual obscurations, head pain, nausea and photophobia all fit. I think it is reasonable to order an MRI with gadolinium to look for signs of spontaneous intracranial hypotension. Discussed with the patient that his meclizine and ondansetron are for his symptoms and if they are not helping symptoms he does need to take them    I also suggested that he do a trial of having 1 caffeinated beverage every morning for a week or so to see if his symptoms are improved. I raised the possibility that his symptoms are a neurological reaction to anxiety however I feel it is reasonable to pursue further neurologic work-up before focusing on such a diagnosis. The encounter diagnosis was Headache due to low cerebrospinal fluid pressure. 1.       Orders Placed This Encounter   Procedures    MRI BRAIN W WO CONTRAST     Standing Status:   Future     Standing Expiration Date:   12/13/2022     Order Specific Question:   Reason for exam:     Answer:   intracranial hypotension     Return in about 3 months (around 3/13/2022).             Electronically signed by Regan Spangler MD on 12/13/2021 at 2:45 PM

## 2021-12-16 ENCOUNTER — HOSPITAL ENCOUNTER (OUTPATIENT)
Dept: PHYSICAL THERAPY | Age: 41
Setting detail: THERAPIES SERIES
Discharge: HOME | End: 2021-12-16
Payer: COMMERCIAL

## 2021-12-16 PROCEDURE — 97530 THERAPEUTIC ACTIVITIES: CPT

## 2021-12-16 PROCEDURE — 97110 THERAPEUTIC EXERCISES: CPT
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
## 2021-12-29 ENCOUNTER — OFFICE VISIT (OUTPATIENT)
Dept: FAMILY MEDICINE CLINIC | Age: 41
End: 2021-12-29
Payer: COMMERCIAL

## 2021-12-29 VITALS
SYSTOLIC BLOOD PRESSURE: 104 MMHG | HEART RATE: 88 BPM | DIASTOLIC BLOOD PRESSURE: 70 MMHG | OXYGEN SATURATION: 98 % | TEMPERATURE: 97.5 F

## 2021-12-29 DIAGNOSIS — R51.9 CHRONIC INTRACTABLE HEADACHE, UNSPECIFIED HEADACHE TYPE: ICD-10-CM

## 2021-12-29 DIAGNOSIS — H53.8 BLURRY VISION, BILATERAL: ICD-10-CM

## 2021-12-29 DIAGNOSIS — G89.29 CHRONIC INTRACTABLE HEADACHE, UNSPECIFIED HEADACHE TYPE: ICD-10-CM

## 2021-12-29 DIAGNOSIS — R11.2 NAUSEA AND VOMITING, INTRACTABILITY OF VOMITING NOT SPECIFIED, UNSPECIFIED VOMITING TYPE: ICD-10-CM

## 2021-12-29 DIAGNOSIS — F41.8 MIXED ANXIETY DEPRESSIVE DISORDER: Primary | ICD-10-CM

## 2021-12-29 DIAGNOSIS — H93.13 TINNITUS OF BOTH EARS: ICD-10-CM

## 2021-12-29 DIAGNOSIS — Z83.3 FAMILY HISTORY OF DIABETES MELLITUS: ICD-10-CM

## 2021-12-29 LAB — HBA1C MFR BLD: 5.2 %

## 2021-12-29 PROCEDURE — 4004F PT TOBACCO SCREEN RCVD TLK: CPT | Performed by: NURSE PRACTITIONER

## 2021-12-29 PROCEDURE — 83036 HEMOGLOBIN GLYCOSYLATED A1C: CPT | Performed by: NURSE PRACTITIONER

## 2021-12-29 PROCEDURE — G8427 DOCREV CUR MEDS BY ELIG CLIN: HCPCS | Performed by: NURSE PRACTITIONER

## 2021-12-29 PROCEDURE — G8419 CALC BMI OUT NRM PARAM NOF/U: HCPCS | Performed by: NURSE PRACTITIONER

## 2021-12-29 PROCEDURE — G8484 FLU IMMUNIZE NO ADMIN: HCPCS | Performed by: NURSE PRACTITIONER

## 2021-12-29 PROCEDURE — 99214 OFFICE O/P EST MOD 30 MIN: CPT | Performed by: NURSE PRACTITIONER

## 2021-12-29 RX ORDER — SERTRALINE HYDROCHLORIDE 100 MG/1
100 TABLET, FILM COATED ORAL DAILY
Qty: 90 TABLET | Refills: 0 | Status: SHIPPED | OUTPATIENT
Start: 2021-12-29 | End: 2022-09-07 | Stop reason: SINTOL

## 2021-12-29 NOTE — PROGRESS NOTES
[address] [facility] [address] [place] [address] PRIMARY CARE  [address] [place] [address]  Dept: [phone]  Dept Fax: [phone]  Loc: [phone]     Subjective     Lauren Edmondson 37 y.o. male presents 12/29/21 with   Chief Complaint   Patient presents with    1 Month Follow-Up     vision, headaches, and dizziness. stopped taking hydroxyzine        HPI    Patient presents for follow up regarding blurry vision, tinnitus, vertigo. States two days ago his vision got really bad, almost saw halos in front of him. Very fatigued, barely does anything and is exhausted. Still having headaches. Still having dizziness, but stopped the meclizine because it was not helping. Tinnitus, dizziness, fatigue, blotchy vision, when he goes to do something and all of a sudden he gets very sweaty and these symptoms will occur. Recently saw neurology, checking MRI brain, also discussed that his symptoms could possibly be a neurological reaction to anxiety. Also saw the ophthalmologist with a negative workup. Also saw GI, has several tests scheduled. Reviewed the following history:    Past Medical History:   Diagnosis Date    Vertigo      No past surgical history on file.   Family History   Problem Relation Age of Onset    Alcohol Abuse Father        Allergies   Allergen Reactions    Penicillins Anaphylaxis and Nausea And Vomiting       Current Outpatient Medications on File Prior to Visit   Medication Sig Dispense Refill    omeprazole (PRILOSEC) 40 MG delayed release capsule Take 1 capsule by mouth every morning (before breakfast) 90 capsule 1    ondansetron (ZOFRAN-ODT) 4 MG disintegrating tablet Place 1 tablet under the tongue 3 times daily as needed for Nausea or Vomiting 21 tablet 0    fluticasone (FLONASE) 50 MCG/ACT nasal spray 1 spray by Each Nostril route daily 16 g 0    dicyclomine (BENTYL) 20 MG tablet take 1 tablet by mouth four times a day if needed      magnesium gluconate (MAGONATE) 500 MG tablet Take by mouth       polyethylene glycol (GOLYTELY) 236 g solution Take by mouth       No current facility-administered medications on file prior to visit. Review of Systems   Constitutional: Negative for chills and fever. HENT: Negative for congestion, rhinorrhea and sore throat. Eyes: Positive for visual disturbance. Respiratory: Negative for cough, shortness of breath and wheezing. Cardiovascular: Negative for chest pain. Gastrointestinal: Positive for nausea. Negative for abdominal pain, diarrhea and vomiting. Musculoskeletal: Negative for back pain and myalgias. Skin: Negative for rash. Neurological: Positive for dizziness and headaches. Tinnitus       Objective    Vitals:    12/29/21 1325   BP: 104/70   Site: Left Upper Arm   Position: Sitting   Cuff Size: Medium Adult   Pulse: 88   Temp: 97.5 °F (36.4 °C)   TempSrc: Infrared   SpO2: 98%       Physical Exam  Constitutional:       General: He is not in acute distress. Appearance: Normal appearance. He is not ill-appearing or toxic-appearing. HENT:      Head: Normocephalic and atraumatic. Right Ear: Hearing and external ear normal.      Left Ear: Hearing and external ear normal.   Eyes:      General: Lids are normal.      Conjunctiva/sclera: Conjunctivae normal.   Cardiovascular:      Rate and Rhythm: Normal rate and regular rhythm. Heart sounds: Normal heart sounds. Pulmonary:      Effort: Pulmonary effort is normal. No respiratory distress. Breath sounds: Normal breath sounds. No decreased breath sounds, wheezing, rhonchi or rales. Musculoskeletal:      Cervical back: Normal range of motion and neck supple. Skin:     General: Skin is warm and dry. Neurological:      General: No focal deficit present. Mental Status: He is alert and oriented to person, place, and time. Cranial Nerves: Cranial nerves are intact.       Sensory: Sensation is intact. Motor: Motor function is intact. Coordination: Coordination is intact. Gait: Gait is intact. Psychiatric:         Attention and Perception: Attention normal.         Mood and Affect: Mood normal.         Speech: Speech normal.         Behavior: Behavior normal.         Thought Content: Thought content normal.         Cognition and Memory: Cognition normal.         Judgment: Judgment normal.       POC Testing Today:   Results for POC orders placed in visit on 12/29/21   POCT glycosylated hemoglobin (Hb A1C)   Result Value Ref Range    Hemoglobin A1C 5.2 %       Assessment and Plan    Daria Camacho 37 y.o. male presenting for several issues    1. Mixed anxiety depressive disorder  - Increasing zoloft  - sertraline (ZOLOFT) 100 MG tablet; Take 1 tablet by mouth daily  Dispense: 90 tablet; Refill: 0  - TSH with Reflex; Future  - Comprehensive Metabolic Panel; Future  - CBC With Auto Differential; Future    2. Blurry vision, bilateral  - POCT glycosylated hemoglobin (Hb A1C)    3. Tinnitus of both ears  - Sees neuro    4. Nausea and vomiting, intractability of vomiting not specified, unspecified vomiting type  - sees GI    5. Chronic intractable headache, unspecified headache type  - Sees neuro    6. Family history of diabetes mellitus  - POCT glycosylated hemoglobin (Hb A1C)      Procedures:  Unless otherwise noted below, none    Return in about 4 weeks (around 1/26/2022) for follow up, PRN for any acute conditions. Side effects and adverse effects of any medication prescribed today, as well as treatment plan/rationale, follow-up care, and result expectations have been discussed with the patient. Expresses understanding and desires to proceed with treatment plan. The patient was reminded that if an antibiotic has been prescribed the predicted course is improvement to cure with no persistent issues. Take antibiotics as directed.   If any problems occur, an appointment should be made or ER visit if severe. Because of the risk with ANY antibiotic of C. Difficile colitis if persistent diarrhea or abdominal pain or any concerning symptoms, we should be notified. To reduce this risk, a probiotic pill, yogurt or other preparations containing active cultures should be ingested daily -particularly while on the antibiotic. If any persistent symptoms of illness, follow up appointment should be made in a timely fashion with a physician. Discussed signs and symptoms which require immediate follow-up in ED/call to 911. Understanding verbalized. I have reviewed and updated the electronic medical record.     Nelsy Cedillo, APRN - CNP

## 2022-01-04 ENCOUNTER — HOSPITAL ENCOUNTER (OUTPATIENT)
Dept: MRI IMAGING | Age: 42
Discharge: HOME | End: 2022-01-06
Payer: COMMERCIAL

## 2022-01-04 DIAGNOSIS — R51.0 HEADACHE DUE TO LOW CEREBROSPINAL FLUID PRESSURE: ICD-10-CM

## 2022-01-04 PROCEDURE — 70553 MRI BRAIN STEM W/O & W/DYE: CPT

## 2022-01-04 PROCEDURE — 6360000004 HC RX CONTRAST MEDICATION

## 2022-01-04 PROCEDURE — A9577 INJ MULTIHANCE: HCPCS

## 2022-01-04 RX ORDER — SODIUM CHLORIDE 9 MG/ML
10 INJECTION INTRAVENOUS ONCE
Status: DISCONTINUED | OUTPATIENT
Start: 2022-01-04 | End: 2022-01-07 | Stop reason: HOSPADM

## 2022-01-04 RX ADMIN — GADOBENATE DIMEGLUMINE 15 ML: 529 INJECTION, SOLUTION INTRAVENOUS at 11:58

## 2022-01-06 ENCOUNTER — NURSE ONLY (OUTPATIENT)
Dept: GASTROENTEROLOGY | Age: 42
End: 2022-01-06

## 2022-01-06 DIAGNOSIS — G96.810 INTRACRANIAL HYPOTENSION: Primary | ICD-10-CM

## 2022-01-06 DIAGNOSIS — Z01.818 PRE-OP TESTING: Primary | ICD-10-CM

## 2022-01-06 DIAGNOSIS — Z01.818 PRE-OP TESTING: ICD-10-CM

## 2022-01-06 NOTE — PROGRESS NOTES
Called patient to let him know MRI results. The report comments on intracranial hypertension which was not really my concern. I am a bit worried about the drooping splenium of the corpus callosum as well as flattening of the ventral pratik and enlargement of the pituitary. These can be seen in spontaneous intracranial hypotension. When I talk to the patient today to let him know he reported the meclizine did not really help. He tried having caffeine up to 1 to 2 cups a day which did not help much. I think it is reasonable to order cisternogram, which I have done.     Depending on results of this we can consider further intervention or subspecialty referral.

## 2022-01-07 LAB
SARS-COV-2: NOT DETECTED
SOURCE: NORMAL

## 2022-01-12 ENCOUNTER — TELEPHONE (OUTPATIENT)
Dept: NEUROLOGY | Age: 42
End: 2022-01-12

## 2022-01-12 ENCOUNTER — ANESTHESIA EVENT (OUTPATIENT)
Dept: ENDOSCOPY | Age: 42
End: 2022-01-12
Payer: COMMERCIAL

## 2022-01-12 ENCOUNTER — ANCILLARY PROCEDURE (OUTPATIENT)
Dept: ENDOSCOPY | Age: 42
End: 2022-01-12
Attending: INTERNAL MEDICINE
Payer: COMMERCIAL

## 2022-01-12 ENCOUNTER — HOSPITAL ENCOUNTER (OUTPATIENT)
Age: 42
Setting detail: OUTPATIENT SURGERY
Discharge: HOME | End: 2022-01-12
Attending: INTERNAL MEDICINE | Admitting: INTERNAL MEDICINE
Payer: COMMERCIAL

## 2022-01-12 ENCOUNTER — ANESTHESIA (OUTPATIENT)
Dept: ENDOSCOPY | Age: 42
End: 2022-01-12
Payer: COMMERCIAL

## 2022-01-12 VITALS
HEART RATE: 96 BPM | RESPIRATION RATE: 18 BRPM | TEMPERATURE: 97.5 F | BODY MASS INDEX: 25.18 KG/M2 | OXYGEN SATURATION: 100 % | SYSTOLIC BLOOD PRESSURE: 112 MMHG | WEIGHT: 170 LBS | HEIGHT: 69 IN | DIASTOLIC BLOOD PRESSURE: 87 MMHG

## 2022-01-12 VITALS — DIASTOLIC BLOOD PRESSURE: 52 MMHG | SYSTOLIC BLOOD PRESSURE: 86 MMHG | OXYGEN SATURATION: 93 %

## 2022-01-12 DIAGNOSIS — G96.00 CSF LEAK: Primary | ICD-10-CM

## 2022-01-12 PROCEDURE — 2709999900 HC NON-CHARGEABLE SUPPLY: Performed by: INTERNAL MEDICINE

## 2022-01-12 PROCEDURE — 3609027000 HC COLONOSCOPY: Performed by: INTERNAL MEDICINE

## 2022-01-12 PROCEDURE — 7100000011 HC PHASE II RECOVERY - ADDTL 15 MIN: Performed by: INTERNAL MEDICINE

## 2022-01-12 PROCEDURE — 43239 EGD BIOPSY SINGLE/MULTIPLE: CPT | Performed by: INTERNAL MEDICINE

## 2022-01-12 PROCEDURE — 2580000003 HC RX 258

## 2022-01-12 PROCEDURE — 6370000000 HC RX 637 (ALT 250 FOR IP): Performed by: INTERNAL MEDICINE

## 2022-01-12 PROCEDURE — 88313 SPECIAL STAINS GROUP 2: CPT

## 2022-01-12 PROCEDURE — 2500000003 HC RX 250 WO HCPCS: Performed by: REGISTERED NURSE

## 2022-01-12 PROCEDURE — 45385 COLONOSCOPY W/LESION REMOVAL: CPT | Performed by: INTERNAL MEDICINE

## 2022-01-12 PROCEDURE — 2580000003 HC RX 258: Performed by: INTERNAL MEDICINE

## 2022-01-12 PROCEDURE — 3609017100 HC EGD: Performed by: INTERNAL MEDICINE

## 2022-01-12 PROCEDURE — 6360000002 HC RX W HCPCS: Performed by: REGISTERED NURSE

## 2022-01-12 PROCEDURE — 3700000000 HC ANESTHESIA ATTENDED CARE: Performed by: INTERNAL MEDICINE

## 2022-01-12 PROCEDURE — 7100000010 HC PHASE II RECOVERY - FIRST 15 MIN: Performed by: INTERNAL MEDICINE

## 2022-01-12 PROCEDURE — 88342 IMHCHEM/IMCYTCHM 1ST ANTB: CPT

## 2022-01-12 PROCEDURE — 88305 TISSUE EXAM BY PATHOLOGIST: CPT

## 2022-01-12 PROCEDURE — 3700000001 HC ADD 15 MINUTES (ANESTHESIA): Performed by: INTERNAL MEDICINE

## 2022-01-12 RX ORDER — LIDOCAINE HYDROCHLORIDE 20 MG/ML
INJECTION, SOLUTION EPIDURAL; INFILTRATION; INTRACAUDAL; PERINEURAL PRN
Status: DISCONTINUED | OUTPATIENT
Start: 2022-01-12 | End: 2022-01-12 | Stop reason: SDUPTHER

## 2022-01-12 RX ORDER — GLYCOPYRROLATE 1 MG/5 ML
SYRINGE (ML) INTRAVENOUS PRN
Status: DISCONTINUED | OUTPATIENT
Start: 2022-01-12 | End: 2022-01-12 | Stop reason: SDUPTHER

## 2022-01-12 RX ORDER — PROPOFOL 10 MG/ML
INJECTION, EMULSION INTRAVENOUS PRN
Status: DISCONTINUED | OUTPATIENT
Start: 2022-01-12 | End: 2022-01-12 | Stop reason: SDUPTHER

## 2022-01-12 RX ORDER — MAGNESIUM HYDROXIDE 1200 MG/15ML
LIQUID ORAL PRN
Status: DISCONTINUED | OUTPATIENT
Start: 2022-01-12 | End: 2022-01-12 | Stop reason: ALTCHOICE

## 2022-01-12 RX ORDER — SODIUM CHLORIDE 9 MG/ML
INJECTION, SOLUTION INTRAVENOUS
Status: COMPLETED
Start: 2022-01-12 | End: 2022-01-12

## 2022-01-12 RX ORDER — SIMETHICONE 20 MG/.3ML
EMULSION ORAL PRN
Status: DISCONTINUED | OUTPATIENT
Start: 2022-01-12 | End: 2022-01-12 | Stop reason: ALTCHOICE

## 2022-01-12 RX ORDER — ONDANSETRON 2 MG/ML
INJECTION INTRAMUSCULAR; INTRAVENOUS PRN
Status: DISCONTINUED | OUTPATIENT
Start: 2022-01-12 | End: 2022-01-12 | Stop reason: SDUPTHER

## 2022-01-12 RX ORDER — SODIUM CHLORIDE 9 MG/ML
INJECTION, SOLUTION INTRAVENOUS CONTINUOUS
Status: DISCONTINUED | OUTPATIENT
Start: 2022-01-12 | End: 2022-01-12 | Stop reason: HOSPADM

## 2022-01-12 RX ADMIN — ONDANSETRON 4 MG: 2 INJECTION INTRAMUSCULAR; INTRAVENOUS at 11:52

## 2022-01-12 RX ADMIN — PROPOFOL 30 MG: 10 INJECTION, EMULSION INTRAVENOUS at 12:13

## 2022-01-12 RX ADMIN — PROPOFOL 30 MG: 10 INJECTION, EMULSION INTRAVENOUS at 12:07

## 2022-01-12 RX ADMIN — PROPOFOL 20 MG: 10 INJECTION, EMULSION INTRAVENOUS at 12:00

## 2022-01-12 RX ADMIN — SODIUM CHLORIDE: 9 INJECTION, SOLUTION INTRAVENOUS at 11:08

## 2022-01-12 RX ADMIN — PROPOFOL 100 MG: 10 INJECTION, EMULSION INTRAVENOUS at 11:52

## 2022-01-12 RX ADMIN — LIDOCAINE HYDROCHLORIDE 80 MG: 20 INJECTION, SOLUTION EPIDURAL; INFILTRATION; INTRACAUDAL; PERINEURAL at 11:52

## 2022-01-12 RX ADMIN — PROPOFOL 30 MG: 10 INJECTION, EMULSION INTRAVENOUS at 12:03

## 2022-01-12 RX ADMIN — PROPOFOL 20 MG: 10 INJECTION, EMULSION INTRAVENOUS at 12:09

## 2022-01-12 RX ADMIN — Medication 0.2 MG: at 11:52

## 2022-01-12 RX ADMIN — PROPOFOL 20 MG: 10 INJECTION, EMULSION INTRAVENOUS at 11:58

## 2022-01-12 RX ADMIN — PROPOFOL 30 MG: 10 INJECTION, EMULSION INTRAVENOUS at 12:16

## 2022-01-12 RX ADMIN — PROPOFOL 30 MG: 10 INJECTION, EMULSION INTRAVENOUS at 11:55

## 2022-01-12 RX ADMIN — PROPOFOL 30 MG: 10 INJECTION, EMULSION INTRAVENOUS at 12:05

## 2022-01-12 ASSESSMENT — PULMONARY FUNCTION TESTS
PIF_VALUE: 1

## 2022-01-12 ASSESSMENT — PAIN - FUNCTIONAL ASSESSMENT: PAIN_FUNCTIONAL_ASSESSMENT: 0-10

## 2022-01-12 NOTE — H&P
Patient Name: Malgorzata Burnett  : 1980  MRN: [id number]  DATE: 22      ENDOSCOPY  History and Physical    Procedure:    [x] Diagnostic Colonoscopy       [] Screening Colonoscopy  [x] EGD      [] ERCP      [] EUS       [] Other    [x] Previous office notes/History and Physical reviewed from the patients chart. Please see EMR for further details of HPI. I have examined the patient's status immediately prior to the procedure and:      Indications/HPI:    [x]Abdominal Pain   []Cancer- GI/Lung  []Fhx of colon CA/polyps  []History of Polyps   []Bhaktas   []Melena  []Abnormal Imaging   []Dysphagia    []Persistent Pneumonia  []Anemia   []Food Impaction  []History of Polyps  [x]GI Bleed   []Pulmonary nodule/Mass  []Change in bowel habits  []Heartburn/Reflux  []Rectal Bleed (BRBPR)  []Chest Pain - Non Cardiac  []Heme (+) Stool  []Ulcers  []Constipation   []Hemoptysis   []Varices  []Diarrhea   []Hypoxemia  [x]Nausea/Vomiting   []Screening   []Crohns/Colitis  []Other:    Anesthesia:   [x] MAC [] Moderate Sedation   [] General   [] None     ROS: 12 pt Review of Symptoms was negative unless mentioned above    Medications:   Prior to Admission medications    Medication Sig Start Date End Date Taking?  Authorizing Provider   sertraline (ZOLOFT) 100 MG tablet Take 1 tablet by mouth daily 21   ZAY Santana CNP   omeprazole (PRILOSEC) 40 MG delayed release capsule Take 1 capsule by mouth every morning (before breakfast) 21   Baldomero Aly MD   ondansetron (ZOFRAN-ODT) 4 MG disintegrating tablet Place 1 tablet under the tongue 3 times daily as needed for Nausea or Vomiting 21   ZAY Santana CNP   fluticasone (FLONASE) 50 MCG/ACT nasal spray 1 spray by Each Nostril route daily 11/10/21   ZAY Santana CNP   dicyclomine (BENTYL) 20 MG tablet take 1 tablet by mouth four times a day if needed 21   Historical Provider, MD   magnesium gluconate (MAGONATE) 500 MG tablet Take by mouth  9/9/21   Historical Provider, MD   polyethylene glycol (GOLYTELY) 236 g solution Take by mouth 9/20/21   Historical Provider, MD       Allergies: Allergies   Allergen Reactions    Penicillins Anaphylaxis and Nausea And Vomiting        History of allergic reaction to anesthesia:  No    Past Medical History:  Past Medical History:   Diagnosis Date    Vertigo        Past Surgical History:  No past surgical history on file. Social History:  Social History     Tobacco Use    Smoking status: Current Every Day Smoker     Packs/day: 1.00     Years: 20.00     Pack years: 20.00     Types: Cigarettes     Start date: 10/27/1995    Smokeless tobacco: Never Used   Vaping Use    Vaping Use: Never used   Substance Use Topics    Alcohol use: Never    Drug use: Yes     Types: Marijuana Rustam Corona)       Vital Signs: There were no vitals filed for this visit. Physical Exam:  Cardiac:  [x]WNL  []Comments:  Pulmonary:  [x]WNL   []Comments:   Neuro/Mental Status:  [x]WNL  []Comments:  Abdominal:  [x]WNL    []Comments:  Other:   []WNL  []Comments:    Informed Consent:  The risks and benefits of the procedure have been discussed with either the patient or if they cannot consent, their representative. Assessment:  Patient examined and appropriate for planned sedation and procedure. Plan:  Proceed with planned sedation and procedure as above.     Eduardo Mace MD  10:23 AM

## 2022-01-12 NOTE — TELEPHONE ENCOUNTER
Scheduling called and order for Cisternogram needs to have a new order put in specifying wether it has to be done in nuclear medicine or CT.     Mariam Gordon

## 2022-01-12 NOTE — ANESTHESIA PRE PROCEDURE
Department of Anesthesiology  Preprocedure Note       Name:  Wilder Zapien   Age:  37 y.o.  :  1980                                          MRN:  [id number]         Date:  2022      Surgeon: Patti Ferrara):  Jagdish Sadler MD    Procedure: Procedure(s):  EGD DIAGNOSTIC ONLY  COLONOSCOPY DIAGNOSTIC    Medications prior to admission:   Prior to Admission medications    Medication Sig Start Date End Date Taking? Authorizing Provider   sertraline (ZOLOFT) 100 MG tablet Take 1 tablet by mouth daily 21  Yes ZAY Pulliam CNP   fluticasone (FLONASE) 50 MCG/ACT nasal spray 1 spray by Each Nostril route daily 11/10/21  Yes ZAY Pulliam CNP   dicyclomine (BENTYL) 20 MG tablet take 1 tablet by mouth four times a day if needed 21  Yes Historical Provider, MD   omeprazole (PRILOSEC) 40 MG delayed release capsule Take 1 capsule by mouth every morning (before breakfast) 21   Jagdish Sadler MD   ondansetron (ZOFRAN-ODT) 4 MG disintegrating tablet Place 1 tablet under the tongue 3 times daily as needed for Nausea or Vomiting 21   ZAY Pulliam CNP   magnesium gluconate (MAGONATE) 500 MG tablet Take by mouth  21   Historical Provider, MD   polyethylene glycol (GOLYTELY) 236 g solution Take by mouth 21   Historical Provider, MD       Current medications:    Current Facility-Administered Medications   Medication Dose Route Frequency Provider Last Rate Last Admin    sodium chloride 0.9 % infusion             0.9 % sodium chloride infusion   IntraVENous Continuous Jagdish Sadler MD           Allergies:     Allergies   Allergen Reactions    Penicillins Anaphylaxis and Nausea And Vomiting       Problem List:    Patient Active Problem List   Diagnosis Code    Polycystic kidney disease Q61.3    Abdominal pain R10.9    Benign paroxysmal positional vertigo H81.10    Body mass index (BMI) of 20 to 24 [id number]    Gastroesophageal reflux disease K21.9    Hematochezia K92.1    Lung nodule R91.1    Mixed anxiety depressive disorder F41.8    Nausea and vomiting R11.2    Psychiatric symptoms F99    Viral pharyngitis J02.9       Past Medical History:        Diagnosis Date    Vertigo        Past Surgical History:        Procedure Laterality Date    HERNIA REPAIR      SKIN BIOPSY         Social History:    Social History     Tobacco Use    Smoking status: Current Every Day Smoker     Packs/day: 0.50     Years: 20.00     Pack years: 10.00     Types: Cigarettes     Start date: 10/27/1995    Smokeless tobacco: Never Used   Substance Use Topics    Alcohol use: Never                                Ready to quit: No  Counseling given: Yes      Vital Signs (Current):   Vitals:    01/12/22 1050   BP: (!) 123/7   Pulse: 99   Resp: 20   Temp: 36.4 °C (97.5 °F)   TempSrc: Temporal   SpO2: 95%   Weight: 170 lb (77.1 kg)   Height: 5' 9\" (1.753 m)                                              BP Readings from Last 3 Encounters:   01/12/22 (!) 123/7   12/29/21 104/70   12/13/21 134/78       NPO Status: Time of last liquid consumption: 0500                        Time of last solid consumption: 1500                        Date of last liquid consumption: 01/12/22                        Date of last solid food consumption: 01/10/22    BMI:   Wt Readings from Last 3 Encounters:   01/12/22 170 lb (77.1 kg)   12/13/21 170 lb (77.1 kg)   12/07/21 170 lb (77.1 kg)     Body mass index is 25.1 kg/m². CBC: No results found for: WBC, RBC, HGB, HCT, MCV, RDW, PLT    CMP: No results found for: NA, K, CL, CO2, BUN, CREATININE, GFRAA, AGRATIO, LABGLOM, GLUCOSE, GLU, PROT, CALCIUM, BILITOT, ALKPHOS, AST, ALT    POC Tests: No results for input(s): POCGLU, POCNA, POCK, POCCL, POCBUN, POCHEMO, POCHCT in the last 72 hours.     Coags: No results found for: PROTIME, INR, APTT    HCG (If Applicable): No results found for: PREGTESTUR, PREGSERUM, HCG, HCGQUANT     ABGs: No results found for: PHART, PO2ART, KXI9IHQ, VFQ8NFM, BEART, M6IEIAVB     Type & Screen (If Applicable):  No results found for: LABABO, LABRH    Drug/Infectious Status (If Applicable):  No results found for: HIV, HEPCAB    COVID-19 Screening (If Applicable):   Lab Results   Component Value Date    COVID19 Not Detected 01/06/2022           Anesthesia Evaluation  Patient summary reviewed and Nursing notes reviewed no history of anesthetic complications:   Airway: Mallampati: II  TM distance: >3 FB   Neck ROM: full  Mouth opening: > = 3 FB Dental:          Pulmonary:                              Cardiovascular:                      Neuro/Psych:   (+) depression/anxiety             GI/Hepatic/Renal:   (+) GERD:, bowel prep,           Endo/Other:                     Abdominal:             Vascular: Other Findings:             Anesthesia Plan      MAC     ASA 2       Induction: intravenous. Anesthetic plan and risks discussed with patient.                       ZAY Hutchins - CRNA   1/12/2022

## 2022-01-13 ENCOUNTER — PRE-PROCEDURE TELEPHONE (OUTPATIENT)
Dept: INTERVENTIONAL RADIOLOGY/VASCULAR | Age: 42
End: 2022-01-13

## 2022-01-13 NOTE — PROGRESS NOTES
Spoke with Dr Tran office to confirm exam ordered for 1/20/22. Nuc Med, CT and specials department notified, exam is to be done with contrast, followed wit CT imaging.

## 2022-01-15 NOTE — RESULT ENCOUNTER NOTE
1/15/2022  0112 [address]     Dear Spenser Patiño,    Your colonoscopy reveled a growth on the large intestine (Colon) called a polyp. A polyp could be a \"precancerous\" growth, which means that with time it could turn into cancer. Polyps were removed during your procedure. As instructed after your colonoscopy, recommendations suggest a follow up colonoscopy in 3 years. We hope you are doing well, and if you have any questions please contact me at [phone]. Thank you for choosing [facility] for your healthcare.     Sincerely,     Harry Ramirez MD

## 2022-01-18 ENCOUNTER — TELEPHONE (OUTPATIENT)
Dept: NEUROLOGY | Age: 42
End: 2022-01-18

## 2022-01-18 DIAGNOSIS — G96.00 CSF LEAK: Primary | ICD-10-CM

## 2022-01-18 NOTE — TELEPHONE ENCOUNTER
For the cisternogram the referral needs to be put in for Dr. Lucie Solo. I wasn't told that when they originally called about  The RBA or CT. Can you put another one in .  Im gonna call the patient and let him know also     Thank you

## 2022-01-20 ENCOUNTER — TELEPHONE (OUTPATIENT)
Dept: INTERVENTIONAL RADIOLOGY/VASCULAR | Age: 42
End: 2022-01-20

## 2022-01-20 ENCOUNTER — INITIAL CONSULT (OUTPATIENT)
Dept: INTERVENTIONAL RADIOLOGY/VASCULAR | Age: 42
End: 2022-01-20
Payer: COMMERCIAL

## 2022-01-20 ENCOUNTER — TELEPHONE (OUTPATIENT)
Dept: NEUROLOGY | Age: 42
End: 2022-01-20

## 2022-01-20 VITALS — BODY MASS INDEX: 25.1 KG/M2 | WEIGHT: 170 LBS

## 2022-01-20 DIAGNOSIS — R51.0 HEADACHE DUE TO LOW CEREBROSPINAL FLUID PRESSURE: ICD-10-CM

## 2022-01-20 DIAGNOSIS — R11.2 NAUSEA AND VOMITING, INTRACTABILITY OF VOMITING NOT SPECIFIED, UNSPECIFIED VOMITING TYPE: ICD-10-CM

## 2022-01-20 DIAGNOSIS — F41.8 MIXED ANXIETY DEPRESSIVE DISORDER: ICD-10-CM

## 2022-01-20 DIAGNOSIS — G96.00 CSF LEAK: Primary | ICD-10-CM

## 2022-01-20 LAB
ALBUMIN SERPL-MCNC: 4.6 G/DL (ref 3.5–4.6)
ALP BLD-CCNC: 102 U/L (ref 35–104)
ALT SERPL-CCNC: 90 U/L (ref 0–41)
ANION GAP SERPL CALCULATED.3IONS-SCNC: 14 MEQ/L (ref 9–15)
AST SERPL-CCNC: 51 U/L (ref 0–40)
BASOPHILS ABSOLUTE: 0.2 K/UL (ref 0–0.2)
BASOPHILS RELATIVE PERCENT: 1.4 %
BILIRUB SERPL-MCNC: 0.5 MG/DL (ref 0.2–0.7)
BUN BLDV-MCNC: 14 MG/DL (ref 6–20)
CALCIUM SERPL-MCNC: 9.6 MG/DL (ref 8.5–9.9)
CHLORIDE BLD-SCNC: 101 MEQ/L (ref 95–107)
CO2: 25 MEQ/L (ref 20–31)
CREAT SERPL-MCNC: 0.94 MG/DL (ref 0.7–1.2)
EOSINOPHILS ABSOLUTE: 0.8 K/UL (ref 0–0.7)
EOSINOPHILS RELATIVE PERCENT: 7.2 %
GFR AFRICAN AMERICAN: >60
GFR NON-AFRICAN AMERICAN: >60
GLOBULIN: 2.9 G/DL (ref 2.3–3.5)
GLUCOSE BLD-MCNC: 87 MG/DL (ref 70–99)
HCT VFR BLD CALC: 51.4 % (ref 42–52)
HEMOGLOBIN: 17.1 G/DL (ref 14–18)
INR BLD: 1
LYMPHOCYTES ABSOLUTE: 4 K/UL (ref 1–4.8)
LYMPHOCYTES RELATIVE PERCENT: 34.8 %
MCH RBC QN AUTO: 31.3 PG (ref 27–31.3)
MCHC RBC AUTO-ENTMCNC: 33.3 % (ref 33–37)
MCV RBC AUTO: 93.9 FL (ref 80–100)
MONOCYTES ABSOLUTE: 0.8 K/UL (ref 0.2–0.8)
MONOCYTES RELATIVE PERCENT: 6.6 %
NEUTROPHILS ABSOLUTE: 5.7 K/UL (ref 1.4–6.5)
NEUTROPHILS RELATIVE PERCENT: 50 %
PDW BLD-RTO: 13.4 % (ref 11.5–14.5)
PLATELET # BLD: 339 K/UL (ref 130–400)
POTASSIUM SERPL-SCNC: 4.2 MEQ/L (ref 3.4–4.9)
PROTHROMBIN TIME: 13.5 SEC (ref 12.3–14.9)
RBC # BLD: 5.47 M/UL (ref 4.7–6.1)
SODIUM BLD-SCNC: 140 MEQ/L (ref 135–144)
TOTAL PROTEIN: 7.5 G/DL (ref 6.3–8)
TSH REFLEX: 2.46 UIU/ML (ref 0.44–3.86)
WBC # BLD: 11.4 K/UL (ref 4.8–10.8)

## 2022-01-20 PROCEDURE — G8419 CALC BMI OUT NRM PARAM NOF/U: HCPCS | Performed by: NURSE PRACTITIONER

## 2022-01-20 PROCEDURE — 99244 OFF/OP CNSLTJ NEW/EST MOD 40: CPT | Performed by: NURSE PRACTITIONER

## 2022-01-20 PROCEDURE — G8427 DOCREV CUR MEDS BY ELIG CLIN: HCPCS | Performed by: NURSE PRACTITIONER

## 2022-01-20 PROCEDURE — G8484 FLU IMMUNIZE NO ADMIN: HCPCS | Performed by: NURSE PRACTITIONER

## 2022-01-20 ASSESSMENT — ENCOUNTER SYMPTOMS
SHORTNESS OF BREATH: 0
ABDOMINAL PAIN: 1
NAUSEA: 1
DIARRHEA: 0
VOMITING: 1
TROUBLE SWALLOWING: 0
COUGH: 0
WHEEZING: 0
ABDOMINAL DISTENTION: 0
SORE THROAT: 0
RESPIRATORY NEGATIVE: 1
BACK PAIN: 0

## 2022-01-20 NOTE — PROGRESS NOTES
VASCULAR MEDICINE AND INTERVENTIONAL RADIOLOGY DEPARTMENT:     Kate Quiñones, a male of 37 y.o. came to the office 1/20/2022. Chief Complaint   Patient presents with    Consultation     cisterongram        HPI:Wesley Camacho referred by neurotology Dr. Tran for evaluation to have cisternogram to evaluate for possible CSF leak. Reports chronic Headaches, pressure in back of head, vertigo, tinnitus, loss of appetite, blurred vision. Going on for about a year with progression. Denies chest pain. Denies dyspnea.      Family History   Problem Relation Age of Onset    Alcohol Abuse Father     Heart Attack Maternal Aunt     Diabetes Paternal Uncle        Past Surgical History:   Procedure Laterality Date    COLONOSCOPY N/A 1/12/2022    COLONOSCOPY DIAGNOSTIC performed by Corazon Fuentes MD at [place] [address]      SKIN BIOPSY      UPPER GASTROINTESTINAL ENDOSCOPY N/A 1/12/2022    EGD DIAGNOSTIC ONLY performed by [facility] at [facility]        Past Medical History:   Diagnosis Date    Vertigo        Social History     Socioeconomic History    Marital status:      Spouse name: Not on file    Number of children: Not on file    Years of education: Not on file    Highest education level: Not on file   Occupational History    Not on file   Tobacco Use    Smoking status: Current Every Day Smoker     Packs/day: 0.50     Years: 20.00     Pack years: 10.00     Types: Cigarettes     Start date: 10/27/1995    Smokeless tobacco: Never Used   Vaping Use    Vaping Use: Never used   Substance and Sexual Activity    Alcohol use: Never    Drug use: Yes     Types: Marijuana (Weed)     Comment: last used 2 days ago    Sexual activity: Not on file   Other Topics Concern    Not on file   Social History Narrative    Not on file     Social Determinants of Health     Financial Resource Strain: Medium Risk    Difficulty of Paying Living Expenses: Somewhat hard Food Insecurity: No Food Insecurity    Worried About Running Out of Food in the Last Year: Never true    Gracy of Food in the Last Year: Never true   Transportation Needs:     Lack of Transportation (Medical): Not on file    Lack of Transportation (Non-Medical):  Not on file   Physical Activity:     Days of Exercise per Week: Not on file    Minutes of Exercise per Session: Not on file   Stress:     Feeling of Stress : Not on file   Social Connections:     Frequency of Communication with Friends and Family: Not on file    Frequency of Social Gatherings with Friends and Family: Not on file    Attends Denominational Services: Not on file    Active Member of [address] [place] [address] [employer] or Organizations: Not on file    Attends Club or Organization Meetings: Not on file    Marital Status: Not on file   Intimate Partner Violence:     Fear of Current or Ex-Partner: Not on file    Emotionally Abused: Not on file    Physically Abused: Not on file    Sexually Abused: Not on file   Housing Stability:     Unable to Pay for Housing in the Last Year: Not on file    Number of [place] in the Last Year: Not on file    Unstable Housing in the Last Year: Not on file       Allergies   Allergen Reactions    Penicillins Anaphylaxis and Nausea And Vomiting       Current Outpatient Medications on File Prior to Visit   Medication Sig Dispense Refill    sertraline (ZOLOFT) 100 MG tablet Take 1 tablet by mouth daily 90 tablet 0    omeprazole (PRILOSEC) 40 MG delayed release capsule Take 1 capsule by mouth every morning (before breakfast) 90 capsule 1    ondansetron (ZOFRAN-ODT) 4 MG disintegrating tablet Place 1 tablet under the tongue 3 times daily as needed for Nausea or Vomiting 21 tablet 0    fluticasone (FLONASE) 50 MCG/ACT nasal spray 1 spray by Each Nostril route daily 16 g 0    dicyclomine (BENTYL) 20 MG tablet take 1 tablet by mouth four times a day if needed      magnesium gluconate (MAGONATE) 500 MG tablet Take by mouth  polyethylene glycol (GOLYTELY) 236 g solution Take by mouth       No current facility-administered medications on file prior to visit. Review of Systems   Constitutional: Positive for appetite change (decreased) and fatigue. Negative for activity change, chills and fever. HENT: Negative. Negative for congestion, sore throat and trouble swallowing. Eyes:        Intermittent blurred vision   Respiratory: Negative. Negative for cough, shortness of breath and wheezing. Cardiovascular: Negative. Negative for chest pain and palpitations. Gastrointestinal: Positive for abdominal pain (epigastric intermittent), nausea and vomiting. Negative for abdominal distention and diarrhea. Endocrine: Negative. Genitourinary: Negative. Negative for difficulty urinating, dysuria and hematuria. Musculoskeletal: Positive for neck pain. Negative for back pain and neck stiffness. Neurological: Positive for dizziness, syncope, light-headedness and headaches. Negative for seizures, weakness and numbness. Hematological: Negative. Psychiatric/Behavioral: The patient is nervous/anxious. OBJECTIVE:  Wt 170 lb (77.1 kg)   BMI 25.10 kg/m²     Physical Exam  Constitutional:       General: He is not in acute distress. Appearance: Normal appearance. He is not ill-appearing. HENT:      Head: Normocephalic and atraumatic. Nose: No congestion. Cardiovascular:      Rate and Rhythm: Normal rate. Heart sounds: Normal heart sounds. Pulmonary:      Effort: Pulmonary effort is normal.   Abdominal:      Palpations: Abdomen is soft. Musculoskeletal:         General: Normal range of motion. Cervical back: Normal range of motion. Skin:     General: Skin is warm and dry. Neurological:      Mental Status: He is alert and oriented to person, place, and time.    Psychiatric:         Mood and Affect: Mood normal.         Behavior: Behavior normal.         ASSESSMENT AND PLAN:  Chart, medications, lab work reviewed. Diagnosis Orders   1. CSF leak  NM CISTERNOGRAM    FL CISTERNOGRAM   2. Headache due to low cerebrospinal fluid pressure  Protime-Inr    NM CISTERNOGRAM    FL CISTERNOGRAM   3. Nausea and vomiting, intractability of vomiting not specified, unspecified vomiting type  Protime-Inr    NM CISTERNOGRAM    FL CISTERNOGRAM          Plan:     Orders Placed This Encounter   Procedures    NM CISTERNOGRAM     Standing Status:   Future     Standing Expiration Date:   1/20/2023    FL CISTERNOGRAM     Standing Status:   Future     Standing Expiration Date:   1/20/2023    Protime-Inr     Standing Status:   Future     Number of Occurrences:   1     Standing Expiration Date:   1/20/2023     Order Specific Question:   Daily Coumadin Dose? Answer:   . ................... No orders of the defined types were placed in this encounter. --  Procedure with risks including infection, bleeding, pain at site, nerve damage, spinal headache, allergic reaction causing anaphylaxis discussed with patient and wishes to proceed. --  Follow up with referring Dr. Tran for pathology results. No further follow up care required in IR after biopsy completed. Thank You Dr. Tran for referral of your patient to our practice for care.      Kaylyn Chapa, APRN - CNP

## 2022-01-20 NOTE — TELEPHONE ENCOUNTER
PATIENT WAS GIVEN THIS INFORMATION PRIOR TO LEAVING THE OFFICE / VIA PHONE CONVERSATION - VOICED UNDERSTANDING  >  YOUR PROCEDURE IS SCHEDULED ON: 02/22/22 @ 9am  >  You will need to arrive at 8am and check in at the Diagnostic Imaging Check In desk.   >  Do not eat or drink after midnight. Sips of water is acceptable ONLY to take medications. >  Make arrangements for transportation, as you should not drive immediately after.   > get pt/imr today

## 2022-01-21 DIAGNOSIS — G96.00 CSF LEAK: Primary | ICD-10-CM

## 2022-01-26 ENCOUNTER — TELEPHONE (OUTPATIENT)
Dept: INTERNAL MEDICINE | Age: 42
End: 2022-01-26

## 2022-01-26 NOTE — TELEPHONE ENCOUNTER
----- Message from Aide Chapin sent at 1/26/2022  3:51 PM EST -----  Subject: Referral Request    QUESTIONS   Reason for referral request? Labs to do with auto immune deficiency   Has the physician seen you for this condition before? No   Preferred Specialist (if applicable)? Do you already have an appointment scheduled? No  Additional Information for Provider? Patient seen the ENT today and the    would like him to have labs done   ---------------------------------------------------------------------------  --------------  CALL BACK INFO  What is the best way for the office to contact you? OK to leave message on   voicemail  Preferred Call Back Phone Number?  [phone]

## 2022-01-26 NOTE — TELEPHONE ENCOUNTER
Pt seen ENT and was told to have autoimmune labs drawn. He would like for you to order these for him.

## 2022-01-27 ENCOUNTER — OFFICE VISIT (OUTPATIENT)
Dept: GASTROENTEROLOGY | Age: 42
End: 2022-01-27
Payer: COMMERCIAL

## 2022-01-27 VITALS
SYSTOLIC BLOOD PRESSURE: 116 MMHG | WEIGHT: 174 LBS | BODY MASS INDEX: 25.7 KG/M2 | DIASTOLIC BLOOD PRESSURE: 70 MMHG | OXYGEN SATURATION: 98 % | HEART RATE: 81 BPM

## 2022-01-27 DIAGNOSIS — K29.70 GASTRITIS WITHOUT BLEEDING, UNSPECIFIED CHRONICITY, UNSPECIFIED GASTRITIS TYPE: ICD-10-CM

## 2022-01-27 DIAGNOSIS — K29.80 DUODENITIS: ICD-10-CM

## 2022-01-27 DIAGNOSIS — D12.6 ADENOMATOUS POLYP OF COLON, UNSPECIFIED PART OF COLON: Primary | ICD-10-CM

## 2022-01-27 PROCEDURE — 99213 OFFICE O/P EST LOW 20 MIN: CPT | Performed by: NURSE PRACTITIONER

## 2022-01-27 PROCEDURE — G8484 FLU IMMUNIZE NO ADMIN: HCPCS | Performed by: NURSE PRACTITIONER

## 2022-01-27 PROCEDURE — G8427 DOCREV CUR MEDS BY ELIG CLIN: HCPCS | Performed by: NURSE PRACTITIONER

## 2022-01-27 PROCEDURE — G8419 CALC BMI OUT NRM PARAM NOF/U: HCPCS | Performed by: NURSE PRACTITIONER

## 2022-01-27 PROCEDURE — 4004F PT TOBACCO SCREEN RCVD TLK: CPT | Performed by: NURSE PRACTITIONER

## 2022-01-27 ASSESSMENT — ENCOUNTER SYMPTOMS
NAUSEA: 1
DIARRHEA: 0
ABDOMINAL DISTENTION: 0
EYE REDNESS: 0
CONSTIPATION: 0
COLOR CHANGE: 0
BLOOD IN STOOL: 0
ANAL BLEEDING: 0
ABDOMINAL PAIN: 0
RECTAL PAIN: 0
TROUBLE SWALLOWING: 0
CHEST TIGHTNESS: 0
PHOTOPHOBIA: 0
VOICE CHANGE: 0
EYE PAIN: 0
VOMITING: 0

## 2022-01-27 NOTE — PROGRESS NOTES
Subjective:      Patient ID: Marly Leary is a 37 y.o. male who presents today for:  Chief Complaint   Patient presents with    Follow Up After Procedure       HPI   Patient came in as follow-up to recent EGD and colonoscopy, EGD noted mild gastritis and duodenitis, biopsies were negative for HP or dysplasia, results discussed at length. Had colonoscopy notable for 2 large polyps, biopsy showed tubular adenoma, and external hemorrhoids. No further blood per rectum. Of note patient was on increased NSAIDs including ibuprofen and Excedrin Migraine for history of chronic headaches, currently undergoing evaluation with neurology,  has spinal tap scheduled for next week. Otherwise has no new complaints or concerns, no further vomiting, Melena, or hematochezia. Background  This is a very pleasant 28-year-old who came in today for the evaluation management of blood per rectum as well as nausea and vomiting. Patient mentioned that he noticed intermittent blood per rectum specially with straining. No previous colonoscopy. No associated change of bowel movement in terms of diarrhea, constipation. No nocturnal progressive abdominal pain. Patient does also endorse episodes of nausea vomiting mainly in the morning. He does take ibuprofen for headache. Patient is currently undergoing work-up for tinnitus and vertigo. Given the episode blood per rectum and vomiting, patient came in today for further evaluation management. Patient denies any fever chills. No associated weight loss. Does report intermittent pain and point toward the epigastrium. Also mentioned that sometimes had difficulty swallowing however symptoms are intermittent in nature. No family history of inflammatory bowel disease or CRC.   Past Medical History:   Diagnosis Date    Vertigo      Past Surgical History:   Procedure Laterality Date    COLONOSCOPY N/A 1/12/2022    COLONOSCOPY DIAGNOSTIC performed by Isaac Banuelos MD at [facility]    HERNIA REPAIR      SKIN BIOPSY      UPPER GASTROINTESTINAL ENDOSCOPY N/A 1/12/2022    EGD DIAGNOSTIC ONLY performed by Baldomero Aly MD at [facility] History     Socioeconomic History    Marital status:      Spouse name: Not on file    Number of children: Not on file    Years of education: Not on file    Highest education level: Not on file   Occupational History    Not on file   Tobacco Use    Smoking status: Current Every Day Smoker     Packs/day: 0.50     Years: 20.00     Pack years: 10.00     Types: Cigarettes     Start date: 10/27/1995    Smokeless tobacco: Never Used   Vaping Use    Vaping Use: Never used   Substance and Sexual Activity    Alcohol use: Never    Drug use: Yes     Types: Marijuana (Weed)     Comment: last used 2 days ago    Sexual activity: Not on file   Other Topics Concern    Not on file   Social History Narrative    Not on file     Social Determinants of Health     Financial Resource Strain: Medium Risk    Difficulty of Paying Living Expenses: Somewhat hard   Food Insecurity: No Food Insecurity    Worried About Running Out of Food in the Last Year: Never true    Gracy of Food in the Last Year: Never true   Transportation Needs:     Lack of Transportation (Medical): Not on file    Lack of Transportation (Non-Medical):  Not on file   Physical Activity:     Days of Exercise per Week: Not on file    Minutes of Exercise per Session: Not on file   Stress:     Feeling of Stress : Not on file   Social Connections:     Frequency of Communication with Friends and Family: Not on file    Frequency of Social Gatherings with Friends and Family: Not on file    Attends Islam Services: Not on file    Active Member of Clubs or Organizations: Not on file    Attends Club or Organization Meetings: Not on file    Marital Status: Not on file   Intimate Partner Violence:     Fear of Current or Ex-Partner: Not on file   [employer] Abused: Not on file    Physically Abused: Not on file    Sexually Abused: Not on file   Housing Stability:     Unable to Pay for Housing in the Last Year: Not on file    Number of Places Lived in the Last Year: Not on file    Unstable Housing in the Last Year: Not on file     Family History   Problem Relation Age of Onset    Alcohol Abuse Father     Heart Attack Maternal Aunt     Diabetes Paternal Uncle      Allergies   Allergen Reactions    Penicillins Anaphylaxis and Nausea And Vomiting         Review of Systems   Constitutional: Negative. Negative for chills, fatigue and fever. HENT: Negative for nosebleeds, tinnitus, trouble swallowing and voice change. Eyes: Negative for photophobia, pain and redness. Respiratory: Negative for chest tightness. Cardiovascular: Negative for chest pain, palpitations and leg swelling. Gastrointestinal: Positive for nausea. Negative for abdominal distention, abdominal pain, anal bleeding, blood in stool, constipation, diarrhea, rectal pain and vomiting. Endocrine: Negative for polydipsia, polyphagia and polyuria. Genitourinary: Negative for difficulty urinating and hematuria. Skin: Negative for color change, pallor and rash. Neurological: Negative for dizziness, speech difficulty and headaches. Psychiatric/Behavioral: Negative for confusion, sleep disturbance and suicidal ideas. Objective:   /70 (Site: Right Upper Arm, Position: Sitting, Cuff Size: Small Adult)   Pulse 81   Wt 174 lb (78.9 kg)   SpO2 98%   BMI 25.70 kg/m²     Physical Exam  Vitals reviewed. Constitutional:       Appearance: Normal appearance. HENT:      Head: Normocephalic and atraumatic. Nose: Nose normal.   Eyes:      Extraocular Movements: Extraocular movements intact. Conjunctiva/sclera: Conjunctivae normal.      Pupils: Pupils are equal, round, and reactive to light. Cardiovascular:      Rate and Rhythm: Normal rate and regular rhythm.       Pulses: Normal pulses. Heart sounds: Normal heart sounds. Pulmonary:      Effort: Pulmonary effort is normal.      Breath sounds: Normal breath sounds. Abdominal:      General: Abdomen is flat. Bowel sounds are normal. There is no distension. Palpations: There is no hepatomegaly, splenomegaly or mass. Tenderness: There is no abdominal tenderness. There is no guarding. Hernia: No hernia is present. Musculoskeletal:         General: No swelling or tenderness. Normal range of motion. Cervical back: Neck supple. Skin:     General: Skin is warm and dry. Coloration: Skin is not jaundiced. Findings: No erythema or rash. Neurological:      General: No focal deficit present. Mental Status: He is alert and oriented to person, place, and time. Psychiatric:         Mood and Affect: Mood normal.         Behavior: Behavior normal.         Laboratory, Pathology, Radiology reviewed in detail with relevantimportant investigations summarized below:  Lab Results   Component Value Date    WBC 11.4 01/20/2022    HGB 17.1 01/20/2022    HCT 51.4 01/20/2022    MCV 93.9 01/20/2022     01/20/2022    . Lab Results   Component Value Date    ALT 90 01/20/2022    AST 51 01/20/2022    ALKPHOS 102 01/20/2022    BILITOT 0.5 01/20/2022       MRI BRAIN W WO CONTRAST    Result Date: 1/6/2022  MRI BRAIN W WO CONTRAST : 1/4/2022 CLINICAL HISTORY: R51.0 Headache due to low cerebrospinal fluid pressure ICD10. COMPARISON: None available. TECHNIQUE: Multiplanar MR imaging of the head was performed before and after intravenous administration of approximately 15 mL of MultiHance gadolinium contrast. FINDINGS: There is no significant dilatation of the optic nerve sheaths or other morphologic findings to suggest pseudotumor cerebri identified. There is no abnormal enhancement, infarct, intracranial hemorrhage, mass effect, midline shift, extra-axial collection, or hydrocephalus.   There is no significant atrophy or white matter changes, for age. An approximately 2.5 cm mucous retention cyst or mucocele is noted within the inferior aspect of the right maxillary sinus. The other paranasal sinuses and mastoid air cells are essentially clear. ESSENTIALLY NEGATIVE HEAD MRI. No results found for: IRON, TIBC, FERRITIN  Lab Results   Component Value Date    INR 1.0 01/20/2022     No components found for: ACUTEHEPATITISSCREEN  No components found for: CELIACPANEL  No components found for: STOOLCULTURE, C.DIFF, STOOLOVAPARASITE, STOOLLEUCOCYTE      Endoscopic hx:  EGD Dr Jesus Alfred 1/12/21  Gastritis and duodenitis  Colonoscopy Dr Jesus Alfred 1/12/21  2 polyps removed with hot snare polypectomy  Normal underlying colonic mucosa including normal distal  terminal ileum. External hemorrhoids  PLAN : Repeat colonoscopy in 3 years  Bx:  A.  GASTRIC BIOPSIES:   MILD CHRONIC INACTIVE GASTRITIS   NEGATIVE FOR HELICOBACTER PYLORI   B.  DUODENAL BIOPSIES:   DUODENAL MUCOSA, NO PATHOLOGIC DIAGNOSIS   VILLOUS ARCHITECTURE WITHIN NORMAL LIMITS   NO EVIDENCE OF INCREASED INTRAEPITHELIAL LYMPHOCYTES   C.  ESOPHAGEAL BIOPSIES:   FOCAL MILD CHRONIC ESOPHAGITIS   NO EVIDENCE OF MCCRARY'S ESOPHAGUS   NO EVIDENCE OF DYSPLASIA   NO EVIDENCE OF EOSINOPHILIC ESOPHAGITIS   D.  ASCENDING COLON POLYP:   TUBULAR ADENOMA   E.  DESCENDING COLON POLYP:   TUBULAR ADENOMA   Assessment:       Diagnosis Orders   1. Adenomatous polyp of colon, unspecified part of colon     2. Gastritis without bleeding, unspecified chronicity, unspecified gastritis type     3. Duodenitis           Plan:   1. Gastritis and duodenitis  EGD noted mild gastritis and duodenitis in the context of increased NSAID use, patient was taking Excedrin Migraine and increased amounts of ibuprofen for chronic headaches, currently undergoing neurology evaluation.   = discussed dose and timing of the PPI, 20 to 30 min before breakfast   = Pathophysiology and etiology of reflux discussed at length  = Lifestyle modification recommended and discussed with the patient   = NO NSAIDS   --Avoid spicy and acidic based foods   --Limit coffee, tea, alcohol use   --Limit the amount of food during meal time   --Avoid bedtime snacks and eat meals 3 to 4 hrs before lying down   --Elevate the head of the bed    --Keep healthy weight    2. Colon polyps  Recent colonoscopy noted two polyps , biopsy noted tubular adenomas. Discussed at length with the patient that Polyps are common (they occur in 30 to 48 percent of adults), Not all polyps will become cancer, It takes many years for a polyp to become cancerous, Polyps can be completely and safely removed. Although the exact causes are not completely understood, lifestyle risk factors include the following:  A high-fat diet, A diet high in red meat, A low-fiber diet, Cigarette smoking, & Obesity.   -Recommend repeat colonoscopy in 3 years    3. Associated medical conditions  Include history of migraines, vertigo and tinnitus for which patient is currently undergoing evaluation    Return in about 3 years (around 1/27/2025), or if symptoms worsen or fail to improve, for colonoscopy.       Marlo Mcpherson, APRN - CNP

## 2022-01-28 ENCOUNTER — OFFICE VISIT (OUTPATIENT)
Dept: FAMILY MEDICINE CLINIC | Age: 42
End: 2022-01-28
Payer: COMMERCIAL

## 2022-01-28 VITALS
DIASTOLIC BLOOD PRESSURE: 80 MMHG | OXYGEN SATURATION: 98 % | HEIGHT: 69 IN | SYSTOLIC BLOOD PRESSURE: 102 MMHG | WEIGHT: 171 LBS | BODY MASS INDEX: 25.33 KG/M2 | HEART RATE: 101 BPM | TEMPERATURE: 98.1 F

## 2022-01-28 DIAGNOSIS — R51.9 INTRACTABLE HEADACHE, UNSPECIFIED CHRONICITY PATTERN, UNSPECIFIED HEADACHE TYPE: Primary | ICD-10-CM

## 2022-01-28 DIAGNOSIS — F41.8 MIXED ANXIETY DEPRESSIVE DISORDER: ICD-10-CM

## 2022-01-28 DIAGNOSIS — G47.00 INSOMNIA, UNSPECIFIED TYPE: ICD-10-CM

## 2022-01-28 DIAGNOSIS — H93.13 TINNITUS OF BOTH EARS: ICD-10-CM

## 2022-01-28 DIAGNOSIS — R74.8 ELEVATED LIVER ENZYMES: ICD-10-CM

## 2022-01-28 DIAGNOSIS — R42 DIZZINESS: ICD-10-CM

## 2022-01-28 PROCEDURE — G8427 DOCREV CUR MEDS BY ELIG CLIN: HCPCS | Performed by: NURSE PRACTITIONER

## 2022-01-28 PROCEDURE — 99214 OFFICE O/P EST MOD 30 MIN: CPT | Performed by: NURSE PRACTITIONER

## 2022-01-28 PROCEDURE — G8419 CALC BMI OUT NRM PARAM NOF/U: HCPCS | Performed by: NURSE PRACTITIONER

## 2022-01-28 PROCEDURE — 4004F PT TOBACCO SCREEN RCVD TLK: CPT | Performed by: NURSE PRACTITIONER

## 2022-01-28 PROCEDURE — G8484 FLU IMMUNIZE NO ADMIN: HCPCS | Performed by: NURSE PRACTITIONER

## 2022-01-28 RX ORDER — ATOGEPANT 10 MG/1
TABLET ORAL
Qty: 30 TABLET | Refills: 0 | Status: SHIPPED | OUTPATIENT
Start: 2022-01-28 | End: 2022-09-07

## 2022-01-28 RX ORDER — AMITRIPTYLINE HYDROCHLORIDE 10 MG/1
10 TABLET, FILM COATED ORAL NIGHTLY
Qty: 30 TABLET | Refills: 0 | Status: SHIPPED | OUTPATIENT
Start: 2022-01-28 | End: 2022-09-07 | Stop reason: ALTCHOICE

## 2022-01-28 NOTE — PROGRESS NOTES
[address] [id number] [facility]  [address] [place] [id number]  Dept: [phone]  Dept Fax: [phone]  Loc: [phone]     Subjective     Kay Camacho 37 y.o. male presents 1/28/22 with   Chief Complaint   Patient presents with    Discuss Labs     MRI 1/4/22    1 Month Follow-Up     dizziness, headaches, mood, hypotension       HPI     Patient presents for follow up for dizziness, headaches, tinnitus. Constant ringing in his ears, headaches towards the back of his head, neck pain. Also with some intermittent blurry/blotchy vision. Following with neurology for possible low cerebrospinal fluid, intracranial hypotension, is going to have a cisternogram 2/2/22 for possible CSF leak. Had MRI brain 1/4/22 which was essentially negative. Also noted with recent labs that liver enzymes are elevated, denies alcohol use. Was taking NSAIDs for headaches but was recently told to stop due to gastritis, following with GI. Now unable to take tylenol due to liver enzymes. Saw ophthalmologist with a negative workup. Also recently saw ENT who stated his ears were okay and not the source of the tinnitus. Advised for him to get worked up for a possible autoimmune condition. Also following up regarding mood, anxiety/depression. Increased zoloft to 100 mg at last visit. Patient states he is doing okay at this dose. Denies SI/HI. Also admits that his sleep has not been that great, and he is not getting that much sleep at night.     Reviewed the following history:    Past Medical History:   Diagnosis Date    Benign paroxysmal positional vertigo 12/2/2021    Comment on above: VERTIGO    External hemorrhoids     Gastritis without bleeding 1/27/2022    Gastroesophageal reflux disease 12/2/2021    Mixed anxiety depressive disorder 12/2/2021    Polycystic kidney disease 10/27/2021    Vertigo      Past Surgical History: Procedure Laterality Date    COLONOSCOPY N/A 1/12/2022    COLONOSCOPY DIAGNOSTIC performed by Vonnie Naqvi MD at [place] [address]      SKIN BIOPSY      UPPER GASTROINTESTINAL ENDOSCOPY N/A 1/12/2022    EGD DIAGNOSTIC ONLY performed by [facility]     Family History   Problem Relation Age of Onset    Alcohol Abuse Father     Heart Attack Maternal Aunt     Diabetes Paternal Uncle        Allergies   Allergen Reactions    Penicillins Anaphylaxis and Nausea And Vomiting       Current Outpatient Medications on File Prior to Visit   Medication Sig Dispense Refill    sertraline (ZOLOFT) 100 MG tablet Take 1 tablet by mouth daily 90 tablet 0    omeprazole (PRILOSEC) 40 MG delayed release capsule Take 1 capsule by mouth every morning (before breakfast) 90 capsule 1    ondansetron (ZOFRAN-ODT) 4 MG disintegrating tablet Place 1 tablet under the tongue 3 times daily as needed for Nausea or Vomiting 21 tablet 0    fluticasone (FLONASE) 50 MCG/ACT nasal spray 1 spray by Each Nostril route daily 16 g 0    dicyclomine (BENTYL) 20 MG tablet take 1 tablet by mouth four times a day if needed      magnesium gluconate (MAGONATE) 500 MG tablet Take by mouth        No current facility-administered medications on file prior to visit. Review of Systems   Constitutional: Negative for chills and fever. HENT: Negative for congestion, rhinorrhea and sore throat. Eyes: Positive for visual disturbance. Respiratory: Negative for cough, shortness of breath and wheezing. Cardiovascular: Negative for chest pain. Gastrointestinal: Negative for abdominal pain, diarrhea, nausea and vomiting. Musculoskeletal: Positive for neck pain. Negative for back pain and myalgias. Skin: Negative for rash. Neurological: Positive for dizziness and headaches. Psychiatric/Behavioral: Positive for dysphoric mood and sleep disturbance. Negative for self-injury.  The patient is nervous/anxious. Objective    Vitals:    01/28/22 1322   BP: 102/80   Pulse: 101   Temp: 98.1 °F (36.7 °C)   TempSrc: Infrared   SpO2: 98%   Weight: 171 lb (77.6 kg)   Height: 5' 9\" (1.753 m)       Physical Exam  Constitutional:       General: He is not in acute distress. Appearance: Normal appearance. He is not ill-appearing or toxic-appearing. HENT:      Head: Normocephalic and atraumatic. Right Ear: Hearing and external ear normal.      Left Ear: Hearing and external ear normal.   Eyes:      General: Lids are normal.      Conjunctiva/sclera: Conjunctivae normal.   Cardiovascular:      Rate and Rhythm: Normal rate and regular rhythm. Heart sounds: Normal heart sounds. Pulmonary:      Effort: Pulmonary effort is normal. No respiratory distress. Breath sounds: Normal breath sounds. No decreased breath sounds, wheezing, rhonchi or rales. Musculoskeletal:      Cervical back: Normal range of motion and neck supple. Skin:     General: Skin is warm and dry. Neurological:      General: No focal deficit present. Mental Status: He is alert and oriented to person, place, and time. Cranial Nerves: Cranial nerves are intact. Sensory: Sensation is intact. Motor: Motor function is intact. Gait: Gait is intact. Comments: No focal neurological deficit    Psychiatric:         Attention and Perception: Attention normal.         Speech: Speech normal.       POC Testing Today: No results found for this visit on 01/28/22. Assessment and Plan    Marguerite Camacho 37 y.o. male presenting for headache     1. Intractable headache, unspecified chronicity pattern, unspecified headache type  - Starting Elavil   - EJ Screen With Reflex; Future  - Sedimentation Rate; Future  - C-Reactive Protein; Future  - CBC With Auto Differential; Future  - Lupus (Le) Panel W/ Reflex; Future  - Rheumatoid Factor; Future  - amitriptyline (ELAVIL) 10 MG tablet;  Take 1 tablet by mouth nightly  Dispense: 30 tablet; Refill: 0  - Adding Atogepant (QULIPTA) 10 MG tablet, to see if this also helps his headaches    2. Dizziness  - Follows with neurology  - EJ Screen With Reflex; Future  - Sedimentation Rate; Future  - C-Reactive Protein; Future   - CBC With Auto Differential; Future  - Lupus (Le) Panel W/ Reflex; Future  - Rheumatoid Factor; Future    3. Tinnitus of both ears  - Follows with neurology    4. Elevated liver enzymes  - Hepatic Function Panel; Future  - Transferrin; Future  - Ferritin; Future  - Hepatitis Panel, Acute; Future  - US GALLBLADDER RUQ; Future    5. Mixed anxiety depressive disorder  - amitriptyline (ELAVIL) 10 MG tablet; Take 1 tablet by mouth nightly  Dispense: 30 tablet; Refill: 0    6. Insomnia, unspecified type  - amitriptyline (ELAVIL) 10 MG tablet; Take 1 tablet by mouth nightly  Dispense: 30 tablet; Refill: 0        Return in about 4 weeks (around 2/25/2022) for follow up, PRN for any acute conditions. Side effects and adverse effects of any medication prescribed today, as well as treatment plan/rationale, follow-up care, and result expectations have been discussed with the patient. Expresses understanding and desires to proceed with treatment plan. The patient was reminded that if an antibiotic has been prescribed the predicted course is improvement to cure with no persistent issues. Take antibiotics as directed. If any problems occur, an appointment should be made or ER visit if severe. Because of the risk with ANY antibiotic of C. Difficile colitis if persistent diarrhea or abdominal pain or any concerning symptoms, we should be notified. To reduce this risk, a probiotic pill, yogurt or other preparations containing active cultures should be ingested daily -particularly while on the antibiotic. If any persistent symptoms of illness, follow up appointment should be made in a timely fashion with a physician.     Discussed signs and symptoms which require immediate follow-up in ED/call to 911. Understanding verbalized. I have reviewed and updated the electronic medical record.     Tawny Taveras, ZAY - CNP

## 2022-02-02 ENCOUNTER — HOSPITAL ENCOUNTER (OUTPATIENT)
Dept: NUCLEAR MEDICINE | Age: 42
Discharge: HOME | End: 2022-02-04
Payer: COMMERCIAL

## 2022-02-02 ENCOUNTER — HOSPITAL ENCOUNTER (OUTPATIENT)
Dept: INTERVENTIONAL RADIOLOGY/VASCULAR | Age: 42
Discharge: HOME | End: 2022-02-04
Payer: COMMERCIAL

## 2022-02-02 VITALS
RESPIRATION RATE: 16 BRPM | SYSTOLIC BLOOD PRESSURE: 120 MMHG | HEART RATE: 68 BPM | OXYGEN SATURATION: 96 % | DIASTOLIC BLOOD PRESSURE: 67 MMHG

## 2022-02-02 DIAGNOSIS — G96.810 INTRACRANIAL HYPOTENSION: ICD-10-CM

## 2022-02-02 DIAGNOSIS — R11.2 NAUSEA AND VOMITING, INTRACTABILITY OF VOMITING NOT SPECIFIED, UNSPECIFIED VOMITING TYPE: ICD-10-CM

## 2022-02-02 DIAGNOSIS — G96.00 CSF LEAK: ICD-10-CM

## 2022-02-02 DIAGNOSIS — R51.0 HEADACHE DUE TO LOW CEREBROSPINAL FLUID PRESSURE: ICD-10-CM

## 2022-02-02 PROCEDURE — 78630 CEREBROSPINAL FLUID SCAN: CPT

## 2022-02-02 PROCEDURE — A9548 IN111 PENTETATE: HCPCS | Performed by: NURSE PRACTITIONER

## 2022-02-02 PROCEDURE — 2709999900 FL CISTERNOGRAM

## 2022-02-02 PROCEDURE — 2500000003 HC RX 250 WO HCPCS: Performed by: RADIOLOGY

## 2022-02-02 PROCEDURE — 78630 CEREBROSPINAL FLUID SCAN: CPT | Performed by: RADIOLOGY

## 2022-02-02 PROCEDURE — 70015 CONTRAST X-RAY OF BRAIN: CPT

## 2022-02-02 PROCEDURE — 3430000000 HC RX DIAGNOSTIC RADIOPHARMACEUTICAL: Performed by: NURSE PRACTITIONER

## 2022-02-02 PROCEDURE — 61055 INJECTION INTO BRAIN CANAL: CPT | Performed by: RADIOLOGY

## 2022-02-02 RX ORDER — LIDOCAINE HYDROCHLORIDE 20 MG/ML
INJECTION, SOLUTION INFILTRATION; PERINEURAL
Status: COMPLETED | OUTPATIENT
Start: 2022-02-02 | End: 2022-02-02

## 2022-02-02 RX ADMIN — LIDOCAINE HYDROCHLORIDE 10 ML: 20 INJECTION, SOLUTION INFILTRATION; PERINEURAL at 09:30

## 2022-02-02 RX ADMIN — Medication 1.1 MICRO CURIE: at 09:36

## 2022-02-02 ASSESSMENT — ENCOUNTER SYMPTOMS
WHEEZING: 0
SHORTNESS OF BREATH: 0
COUGH: 0
ABDOMINAL PAIN: 0
NAUSEA: 0
BACK PAIN: 0
RHINORRHEA: 0
SORE THROAT: 0
DIARRHEA: 0
VOMITING: 0

## 2022-02-02 NOTE — FLOWSHEET NOTE
4450 - Pt arrived back to CT holding via cart, prone position. A&Ox3. Reports headache 1-2 / 10. Denies SOB. VS stable. On RA. Band aid to back site clean / dry / intact. No bleeding or drainage noted. 1007 - Pt to NucMed via cart for additional scans. 1030 - Pt returned to CT holding via cart, supine. Reports still has headache. Offered food, snacks, beverage and pt declined at this time. Pt watching TV per request.     6141 - Pt resting quietly on cart, continues to watch TV. In supine position with HOB elevated approx 20 degrees. Pt reports he cannot lie completely flat due to headache worsening when he does. Still refuses any refreshments at this time. Updated on plan of care and additional nuc med scans at 1400.

## 2022-02-02 NOTE — FLOWSHEET NOTE
Discharge instructions gone over with patient. Patient remains alert and oriented, able to follow commands. Vital signs taken and recorded. Bandaid to lower back remains in place and is clean, dry, and intact. IV in right FA removed. Dry dressing applied. 1350-Patient taken to Nuclear Medicine for the rest of the scanning to be done for today. Patient will be discharged to go home from there.

## 2022-02-02 NOTE — FLOWSHEET NOTE
Patient arrived and brought back to CT holding room. Patient able to walk with steady gait. Patient is alert and oriented, able to follow commands and answer questions appropriately. Patient able to change into gown independently. History, allergies, and home medications reviewed with patient. Patient has been NPO except a sip of water this morning. Vital signs taken and recorded.  Consent obtained for Lumbar puncture for diagnostic radionuclide cisternogram.

## 2022-02-02 NOTE — SEDATION DOCUMENTATION
NO SEDATION    0913 Cisternogram procedure explained. Consent confirmed. VSS. Dr Abe Cheung notified patient ready. 0598 Patient positioned prone on IR table with pillows for support. Imaging done by NH tech using fluoroscopy to visualize lumbar spine. 5256 Time out completed for Lumbar Puncture for diagnostic radionuclide cisternogram.  Site marked by Dr. Abe Cheung, then procedure site prepped with Betadine and draped with sterile drape and towels. 1232 Lumbar spine site then numbed with lidocaine 2% by Dr. Abe Cheung spinal needle placed then used for access into the central canal of the spinal cord using fluoroscopic guidance    6846 Radioactive isotope given to Dr. Abe Cheung, which was prepared  by Shanel Frey from [address] under Dr. Brenda Albrecht supervision. 2686 Band-aid applied. Patient tolerated well and assisted onto cart for recovery period and additional imaging. Post procedure instructions telling patient to stay prone for 30 min until Nuc Med images taken. Patient taken to nuc med on cart for imaging per protocol, Pt will then go to CT Holding room for 4hr and then have another set of images in nuc med.

## 2022-02-02 NOTE — FLOWSHEET NOTE
7674 - Pt arrived back to CT holding via cart, prone position. A&Ox3. Reports headache 1-2 / 10. Denies SOB. VS stable. On RA. Band aid to back site clean / dry / intact. No bleeding or drainage noted. 1007 - Pt to NucMed via cart for additional scans. 1030 - Pt returned to CT holding via cart, supine. Reports still has headache. Offered food, snacks, beverage and pt declined at this time. Pt watching TV per request.     6119 - Pt resting quietly on cart, continues to watch TV. In supine position with HOB elevated approx 20 degrees. Pt reports he cannot lie completely flat due to headache worsening when he does. Band aid site remains clean / dry / intact. Still refuses any refreshments at this time. Updated on plan of care and additional nuc med scans at 1400.

## 2022-02-03 ENCOUNTER — HOSPITAL ENCOUNTER (OUTPATIENT)
Dept: NUCLEAR MEDICINE | Age: 42
Discharge: HOME | End: 2022-02-05
Payer: COMMERCIAL

## 2022-02-03 ENCOUNTER — POST-OP TELEPHONE (OUTPATIENT)
Dept: INTERVENTIONAL RADIOLOGY/VASCULAR | Age: 42
End: 2022-02-03

## 2022-02-03 NOTE — PROGRESS NOTES
----- Message from Michel Valentin sent at 2/24/2017 12:51 PM CST -----  Contact: same  Patient called in and requested a call back from nurse regarding needing clearance for knee surgery on 3/9/17.  Next available is after this date.    Patient call back number is [phone]   Call to patient post cisternogram with Dr. Abe Cheung on 2/2/22. Vm left to call radiology dept with any questions or concerns at [phone].  Electronically signed by Zaynab Javed RN on 2/3/2022 at 10:12 AM

## 2022-02-04 ENCOUNTER — HOSPITAL ENCOUNTER (OUTPATIENT)
Dept: NUCLEAR MEDICINE | Age: 42
Discharge: HOME | End: 2022-02-06
Payer: COMMERCIAL

## 2022-02-07 RX ORDER — METHYLPREDNISOLONE 4 MG/1
TABLET ORAL
Qty: 1 KIT | Refills: 0 | Status: SHIPPED | OUTPATIENT
Start: 2022-02-07 | End: 2022-02-13

## 2022-02-10 ENCOUNTER — TELEPHONE (OUTPATIENT)
Dept: FAMILY MEDICINE CLINIC | Age: 42
End: 2022-02-10

## 2022-02-10 ENCOUNTER — TELEPHONE (OUTPATIENT)
Dept: NEUROLOGY | Age: 42
End: 2022-02-10

## 2022-02-10 NOTE — TELEPHONE ENCOUNTER
----- Message from Angelo Nice sent at 2/9/2022  4:33 PM EST -----  Subject: Results Request    QUESTIONS  Which lab or imaging result is the patient calling about? Cicternogram  Which provider ordered the test?   At what location was the test performed? Date the test was performed? Additional Information for Provider? Pt would like result from the   cisternogram that he had last week  ---------------------------------------------------------------------------  --------------  CALL BACK INFO  What is the best way for the office to contact you? OK to leave message on   voicemail  Preferred Call Back Phone Number?  [phone]

## 2022-02-11 ENCOUNTER — TELEPHONE (OUTPATIENT)
Dept: NEUROLOGY | Age: 42
End: 2022-02-11

## 2022-02-11 NOTE — TELEPHONE ENCOUNTER
Patient called back and advised cisternogram was normal and there was no csf leak. He then questioned why his head was still hurting and neck was killing him? I spoke with patient on Monday 2/7 who stated that he was experiencing this terrible neck pain post cisternogram and under direction of Dr. Jaspreet Rodriguez (since Dr. Luis Felipe Llanes was out of office and had just arrived for afternoon office) advised if worsens to report to ER and if any fever presented to go as well. Patient was advised to rest and drink lots of fluid. Medrol dose pack was called into his pharmacy. I asked if the pain had decreased at all since starting the steroid, he states he did not even  yet. States he just woke up and he has vertigo,his ears are ringing and feels like he needs to \"crack his neck\". If he coughs he states his head feels \"weird like pressure\"     Please contact patient to advise what he should do next. Every time we give provider response he has additional questions. Thank you.

## 2022-02-11 NOTE — TELEPHONE ENCOUNTER
Called patient and discussed that cisternography was normal.  Patient would like to know what is causing his symptoms. Discussed that sometimes primary headache symptoms are diagnosed clinically and we usually do not find abnormal imaging or lab findings unless it is a secondary headache. Patient reports that he has tinnitus, hearing loss, and hearing fullness. He has had audiology testing and reports hearing loss. It appears that he is followed by an ENT and I recommended that he follow-up with them given as the symptoms could be consistent with Ménière's disease. Discussed with patient that it is difficult for me to make an analysis over the phone as I have not seen or examined patient. Recommended that he follow-up with Dr. Tran at a sooner date for further recommendations. Primary care provider had prescribed Brimfield but it was not approved by insurance. Discussed that this is a high efficacy medication for migraine and may be very beneficial for his headaches if there is a migrainous component to them. Patient clarified that initially his headaches were worse following the LP, but feels that his current headaches are at baseline. Patient's LP was on 2/4/2022. I recommended adequate electrolytes and caffeine. Explained to patient that if headache progressively worsening since LP or he cannot even stand up, that he should call Dr. Zeferino Singh before consideration of a blood patch. This does not seem to be the case though. Patient stated understanding and states that he will schedule a sooner appointment.

## 2022-02-11 NOTE — TELEPHONE ENCOUNTER
Tanner Hsieh,    Thank you for being so thorough. When did patient have the lumbar puncture? Can he tolerate standing up and walking? As you have already discussed with him, I advise that he drink plenty beverages with electrolytes as well as caffeine. However, if a week has gone by since the LP and his symptoms have not improved or worsened despite vigorous consumption of electrolytes and caffeine, I would advise him to call Dr. Moreno Cerrato office and inquire whether a blood patch can be done. Thank you.     Warm regards,  [employer]

## 2022-04-04 ENCOUNTER — TELEPHONE (OUTPATIENT)
Dept: FAMILY MEDICINE CLINIC | Age: 42
End: 2022-04-04

## 2022-04-04 NOTE — TELEPHONE ENCOUNTER
Patient advised and states he had a lot of labs done already. Patient scheduled an appointment because he would like to review what he had done already and what he might be missing.

## 2022-04-04 NOTE — TELEPHONE ENCOUNTER
----- Message from Beverlyrick Sebastián sent at 4/4/2022 11:35 AM EDT -----  Subject: Appointment Request    Reason for Call: Routine (Patient Request) No Script    QUESTIONS  Type of Appointment? Established Patient  Reason for appointment request? No appointments available during search  Additional Information for Provider? Pt is experiencing COVID symptoms,   has had them for over a year, brain fog, ringing in ears, extreme fatigue,   and thinks his symptoms match Post Acute COVID Syndrome, would like to   know what to do. States they've had numerous COVID tests and other tests   to figure out what's going on with no real answers. ---------------------------------------------------------------------------  --------------  Kenzie PUENTE  What is the best way for the office to contact you? OK to leave message on   voicemail  Preferred Call Back Phone Number? [phone]  ---------------------------------------------------------------------------  --------------  SCRIPT ANSWERS  Relationship to Patient? Self  (Is the patient requesting to see the provider for a procedure?)? No  (Is the patient requesting to see the provider urgently  today or   tomorrow. )? No  Have you been diagnosed with, awaiting test results for, or told that you   are suspected of having COVID-19 (Coronavirus)? (If patient has tested   negative or was tested as a requirement for work, school, or travel and   not based on symptoms, answer no)? No  Within the past 10 days have you developed any of the following symptoms   (answer no if symptoms have been present longer than 10 days or began   more than 10 days ago)? Fever or Chills, Cough, Shortness of breath or   difficulty breathing, Loss of taste or smell, Sore throat, Nasal   congestion, Sneezing or runny nose, Fatigue or generalized body aches   (answer no if pain is specific to a body part e.g. back pain), Diarrhea,   Headache?  No  Have you had close contact with someone with COVID-19 in the last 7 days? No  (Service Expert  click yes below to proceed with [employer] As Usual   Scheduling)?  Yes

## 2022-04-04 NOTE — TELEPHONE ENCOUNTER
Pt is experiencing COVID symptoms,   has had them for over a year, brain fog, ringing in ears, extreme fatigue,   and thinks his symptoms match Post Acute COVID Syndrome, would like to   know what to do. States they've had numerous COVID tests and other tests   to figure out what's going on with no real answers.

## 2022-04-08 ENCOUNTER — OFFICE VISIT (OUTPATIENT)
Dept: FAMILY MEDICINE CLINIC | Age: 42
End: 2022-04-08
Payer: COMMERCIAL

## 2022-04-08 VITALS
HEIGHT: 69 IN | BODY MASS INDEX: 25.48 KG/M2 | TEMPERATURE: 97.2 F | OXYGEN SATURATION: 98 % | WEIGHT: 172 LBS | DIASTOLIC BLOOD PRESSURE: 84 MMHG | HEART RATE: 103 BPM | SYSTOLIC BLOOD PRESSURE: 120 MMHG

## 2022-04-08 DIAGNOSIS — R53.83 FATIGUE, UNSPECIFIED TYPE: Primary | ICD-10-CM

## 2022-04-08 DIAGNOSIS — R42 DIZZINESS: ICD-10-CM

## 2022-04-08 DIAGNOSIS — H93.13 TINNITUS OF BOTH EARS: ICD-10-CM

## 2022-04-08 PROCEDURE — 4004F PT TOBACCO SCREEN RCVD TLK: CPT | Performed by: NURSE PRACTITIONER

## 2022-04-08 PROCEDURE — 99214 OFFICE O/P EST MOD 30 MIN: CPT | Performed by: NURSE PRACTITIONER

## 2022-04-08 PROCEDURE — G8419 CALC BMI OUT NRM PARAM NOF/U: HCPCS | Performed by: NURSE PRACTITIONER

## 2022-04-08 PROCEDURE — G8427 DOCREV CUR MEDS BY ELIG CLIN: HCPCS | Performed by: NURSE PRACTITIONER

## 2022-04-08 ASSESSMENT — ENCOUNTER SYMPTOMS
VOMITING: 1
NAUSEA: 1

## 2022-04-08 NOTE — PROGRESS NOTES
[address] [facility] [address] [facility] PRIMARY CARE  [address] [place] [id number]  Dept: [phone]  Dept Fax: [phone]  Loc: [phone]     Subjective     Sallie Camacho 37 y.o. male presents 4/8/22 with   Chief Complaint   Patient presents with   [address] [place] [address]     patient brought paperwork in with him    Other     changed diet completely.  Tinnitus     when he is at work this happens. HPI     Patient presents for follow up for several issues including headache, tinnitus, dizziness, blurry vision, fatigue. He has seen multiple specialists. Recently saw neurology who completed a cisternogram for possible CSF leak which was negative. Last visit here was in January, had labs ordered to rule out a possible autoimmune condition, patient never had them completed. States he has tried to change up his diet and his appetite has been better. Admits that his headaches went away. Vision is blurry in the morning, sensitive to light. Constant ringing in ears. He admits that he went back to work two weeks ago. He is getting exhausted and out of breath easily at work. Gets dizzy every couple of hours. Still smoking but not as much.     Reviewed the following history:    Past Medical History:   Diagnosis Date    Benign paroxysmal positional vertigo 12/2/2021    Comment on above: VERTIGO    External hemorrhoids     Gastritis without bleeding 1/27/2022    Gastroesophageal reflux disease 12/2/2021    Mixed anxiety depressive disorder 12/2/2021    Polycystic kidney disease 10/27/2021    Vertigo      Past Surgical History:   Procedure Laterality Date    COLONOSCOPY N/A 1/12/2022    COLONOSCOPY DIAGNOSTIC performed by Jarrod Temple MD at [address] [place] [address]      UPPER GASTROINTESTINAL ENDOSCOPY N/A 1/12/2022    EGD DIAGNOSTIC ONLY performed by [facility]rod [facility] at [facility]     Family History Problem Relation Age of Onset    Alcohol Abuse Father     Heart Attack Maternal Aunt     Diabetes Paternal Uncle        Allergies   Allergen Reactions    Penicillins Anaphylaxis and Nausea And Vomiting       Current Outpatient Medications on File Prior to Visit   Medication Sig Dispense Refill    Atogepant (QULIPTA) 10 MG TABS Take one tablet once daily. 30 tablet 0    amitriptyline (ELAVIL) 10 MG tablet Take 1 tablet by mouth nightly 30 tablet 0    sertraline (ZOLOFT) 100 MG tablet Take 1 tablet by mouth daily 90 tablet 0    omeprazole (PRILOSEC) 40 MG delayed release capsule Take 1 capsule by mouth every morning (before breakfast) 90 capsule 1    ondansetron (ZOFRAN-ODT) 4 MG disintegrating tablet Place 1 tablet under the tongue 3 times daily as needed for Nausea or Vomiting 21 tablet 0    fluticasone (FLONASE) 50 MCG/ACT nasal spray 1 spray by Each Nostril route daily 16 g 0    dicyclomine (BENTYL) 20 MG tablet take 1 tablet by mouth four times a day if needed      magnesium gluconate (MAGONATE) 500 MG tablet Take by mouth        No current facility-administered medications on file prior to visit. Review of Systems   Constitutional: Negative for chills and fever. HENT: Positive for tinnitus. Negative for congestion and rhinorrhea. Respiratory: Positive for shortness of breath. Negative for cough. Gastrointestinal: Positive for nausea and vomiting. Negative for abdominal pain. Neurological: Positive for dizziness. Objective    Vitals:    04/08/22 1512   BP: 120/84   Site: Right Upper Arm   Position: Sitting   Cuff Size: Medium Adult   Pulse: 103   Temp: 97.2 °F (36.2 °C)   TempSrc: Infrared   SpO2: 98%   Weight: 172 lb (78 kg)   Height: 5' 9\" (1.753 m)       Physical Exam  Constitutional:       General: He is not in acute distress. Appearance: Normal appearance. He is not ill-appearing or toxic-appearing. HENT:      Head: Normocephalic and atraumatic.       Right Ear: Hearing and external ear normal.      Left Ear: Hearing and external ear normal.   Eyes:      General: Lids are normal.      Conjunctiva/sclera: Conjunctivae normal.   Cardiovascular:      Rate and Rhythm: Normal rate and regular rhythm. Heart sounds: Normal heart sounds. Pulmonary:      Effort: Pulmonary effort is normal. No respiratory distress. Breath sounds: Normal breath sounds. No decreased breath sounds, wheezing, rhonchi or rales. Musculoskeletal:      Cervical back: Normal range of motion and neck supple. Skin:     General: Skin is warm and dry. Neurological:      General: No focal deficit present. Mental Status: He is alert and oriented to person, place, and time. Psychiatric:         Attention and Perception: Attention normal.         Mood and Affect: Mood normal.         Speech: Speech normal.         Behavior: Behavior normal.         Thought Content: Thought content normal.         Cognition and Memory: Cognition normal.         Judgment: Judgment normal.       POC Testing Today: No results found for this visit on 04/08/22. Assessment and Plan    Caleb Camacho 37 y.o. male presenting for follow up     1. Fatigue, unspecified type  - Ordering labs for fatigue.  - Testosterone Free and Total Male; Future  - Vitamin D 25 Hydroxy; Future  - Vitamin B12 & Folate; Future  - TSH with Reflex; Future  - Comprehensive Metabolic Panel; Future    2. Tinnitus of both ears  - Advised to follow up with ENT     3. Dizziness  - Advised to follow up with neurology      Procedures:  Unless otherwise noted below, none    Return in about 4 weeks (around 5/6/2022) for PRN for any acute conditions, follow up. Side effects and adverse effects of any medication prescribed today, as well as treatment plan/rationale, follow-up care, and result expectations have been discussed with the patient. Expresses understanding and desires to proceed with treatment plan.       The patient was reminded that if an antibiotic has been prescribed the predicted course is improvement to cure with no persistent issues. Take antibiotics as directed. If any problems occur, an appointment should be made or ER visit if severe. Because of the risk with ANY antibiotic of C. Difficile colitis if persistent diarrhea or abdominal pain or any concerning symptoms, we should be notified. To reduce this risk, a probiotic pill, yogurt or other preparations containing active cultures should be ingested daily -particularly while on the antibiotic. If any persistent symptoms of illness, follow up appointment should be made in a timely fashion with a physician. Discussed signs and symptoms which require immediate follow-up in ED/call to 911. Understanding verbalized. I have reviewed and updated the electronic medical record.     ZAY Buchanan - CNP

## 2022-04-18 ASSESSMENT — ENCOUNTER SYMPTOMS
RHINORRHEA: 0
COUGH: 0
ABDOMINAL PAIN: 0
SHORTNESS OF BREATH: 1

## 2022-05-04 ENCOUNTER — TELEPHONE (OUTPATIENT)
Dept: FAMILY MEDICINE CLINIC | Age: 42
End: 2022-05-04

## 2022-05-04 LAB
ALBUMIN SERPL-MCNC: 4.5 G/DL
ALP BLD-CCNC: 72 U/L
ALT SERPL-CCNC: 32 U/L
ANION GAP SERPL CALCULATED.3IONS-SCNC: 12 MMOL/L
AST SERPL-CCNC: 25 U/L
BILIRUB SERPL-MCNC: 0.5 MG/DL (ref 0.1–1.4)
BUN BLDV-MCNC: 14 MG/DL
C-REACTIVE PROTEIN: 0.25
CALCIUM SERPL-MCNC: 9.7 MG/DL
CHLORIDE BLD-SCNC: 103 MMOL/L
CO2: NORMAL
CREAT SERPL-MCNC: 0.99 MG/DL
FERRITIN: 103 NG/ML (ref 18–300)
GFR CALCULATED: 90
GLUCOSE BLD-MCNC: 82 MG/DL
HCT VFR BLD CALC: 45.3 % (ref 41–53)
HEMOGLOBIN: 15.2 G/DL (ref 13.5–17.5)
PLATELET # BLD: 348 K/ΜL
POTASSIUM SERPL-SCNC: 3.9 MMOL/L
RHEUMATOID FACTOR: NORMAL
SODIUM BLD-SCNC: 139 MMOL/L
TESTOSTERONE TOTAL: 645
TOTAL PROTEIN: 7.4
TSH SERPL DL<=0.05 MIU/L-ACNC: 3.93 UIU/ML
VITAMIN B-12: 568
VITAMIN D 25-HYDROXY: 14
VITAMIN D2, 25 HYDROXY: NORMAL
VITAMIN D3,25 HYDROXY: NORMAL
WBC # BLD: 13.1 10^3/ML

## 2022-09-07 ENCOUNTER — OFFICE VISIT (OUTPATIENT)
Dept: FAMILY MEDICINE CLINIC | Age: 42
End: 2022-09-07
Payer: COMMERCIAL

## 2022-09-07 VITALS
BODY MASS INDEX: 25.21 KG/M2 | HEIGHT: 69 IN | DIASTOLIC BLOOD PRESSURE: 78 MMHG | WEIGHT: 170.2 LBS | SYSTOLIC BLOOD PRESSURE: 118 MMHG | TEMPERATURE: 98.2 F | OXYGEN SATURATION: 99 % | HEART RATE: 96 BPM

## 2022-09-07 DIAGNOSIS — F41.8 MIXED ANXIETY DEPRESSIVE DISORDER: Primary | ICD-10-CM

## 2022-09-07 PROCEDURE — 99214 OFFICE O/P EST MOD 30 MIN: CPT | Performed by: NURSE PRACTITIONER

## 2022-09-07 PROCEDURE — G8427 DOCREV CUR MEDS BY ELIG CLIN: HCPCS | Performed by: NURSE PRACTITIONER

## 2022-09-07 PROCEDURE — 4004F PT TOBACCO SCREEN RCVD TLK: CPT | Performed by: NURSE PRACTITIONER

## 2022-09-07 PROCEDURE — G8419 CALC BMI OUT NRM PARAM NOF/U: HCPCS | Performed by: NURSE PRACTITIONER

## 2022-09-07 RX ORDER — HYDROXYZINE 50 MG/1
50 TABLET, FILM COATED ORAL NIGHTLY
Qty: 30 TABLET | Refills: 0 | Status: SHIPPED | OUTPATIENT
Start: 2022-09-07 | End: 2022-10-17

## 2022-09-07 RX ORDER — PROPRANOLOL HYDROCHLORIDE 20 MG/1
20 TABLET ORAL 3 TIMES DAILY
Qty: 90 TABLET | Refills: 3 | Status: SHIPPED | OUTPATIENT
Start: 2022-09-07

## 2022-09-07 ASSESSMENT — PATIENT HEALTH QUESTIONNAIRE - PHQ9
8. MOVING OR SPEAKING SO SLOWLY THAT OTHER PEOPLE COULD HAVE NOTICED. OR THE OPPOSITE, BEING SO FIGETY OR RESTLESS THAT YOU HAVE BEEN MOVING AROUND A LOT MORE THAN USUAL: 0
1. LITTLE INTEREST OR PLEASURE IN DOING THINGS: 0
10. IF YOU CHECKED OFF ANY PROBLEMS, HOW DIFFICULT HAVE THESE PROBLEMS MADE IT FOR YOU TO DO YOUR WORK, TAKE CARE OF THINGS AT HOME, OR GET ALONG WITH OTHER PEOPLE: 0
6. FEELING BAD ABOUT YOURSELF - OR THAT YOU ARE A FAILURE OR HAVE LET YOURSELF OR YOUR FAMILY DOWN: 0
2. FEELING DOWN, DEPRESSED OR HOPELESS: 0
SUM OF ALL RESPONSES TO PHQ9 QUESTIONS 1 & 2: 0
9. THOUGHTS THAT YOU WOULD BE BETTER OFF DEAD, OR OF HURTING YOURSELF: 0
SUM OF ALL RESPONSES TO PHQ QUESTIONS 1-9: 0
SUM OF ALL RESPONSES TO PHQ QUESTIONS 1-9: 0
4. FEELING TIRED OR HAVING LITTLE ENERGY: 0
5. POOR APPETITE OR OVEREATING: 0
7. TROUBLE CONCENTRATING ON THINGS, SUCH AS READING THE NEWSPAPER OR WATCHING TELEVISION: 0
SUM OF ALL RESPONSES TO PHQ QUESTIONS 1-9: 0
3. TROUBLE FALLING OR STAYING ASLEEP: 0
SUM OF ALL RESPONSES TO PHQ QUESTIONS 1-9: 0

## 2022-09-07 NOTE — PROGRESS NOTES
[address] PRIMARY CARE  [address] [place] [address]  Dept: [phone]  Dept Fax: [phone]  Loc: [phone]     Wilda Chauhan 40 y.o. male presents 9/7/22 with   Chief Complaint   Patient presents with    Discuss Medications     Glycopyrrolate, was given to by patients Mother. Takes 2 po BID       HPI    Patient presents for possible medication changes. States he has been taking his mother's glycopyrrolate and it has helped a lot of the symptoms he was having, such as anxiety/stress/nerves, fatigue, nausea. 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 Admits that marijuana has helped in the past. He is requesting to be prescribed robinol. He has also made several diet changes, decreased caffeine, and decreased his smoking. Admits that the smoking causes ringing in his ears, and causes him to blow his nose all the time.      No SI/HI    Reviewed the following history:    Past Medical History:   Diagnosis Date    Benign paroxysmal positional vertigo 12/2/2021    Comment on above: VERTIGO    External hemorrhoids     Gastritis without bleeding 1/27/2022    Gastroesophageal reflux disease 12/2/2021    Mixed anxiety depressive disorder 12/2/2021    Polycystic kidney disease 10/27/2021    Vertigo      Past Surgical History:   Procedure Laterality Date    COLONOSCOPY N/A 1/12/2022    COLONOSCOPY DIAGNOSTIC performed by Dao Gallego MD at [address] [place] [address] ENDOSCOPY N/A 1/12/2022    EGD DIAGNOSTIC ONLY performed by Dao Gallego MD at [facility]     Family History   Problem Relation Age of Onset    Alcohol Abuse Father     Heart Attack Maternal Aunt     Diabetes Paternal Uncle        Allergies   Allergen Reactions    Penicillins Anaphylaxis and Nausea And Vomiting       Current Outpatient Medications on File Prior to Visit   Medication Sig Dispense Refill    ondansetron (ZOFRAN-ODT) 4 MG disintegrating tablet Place 1 tablet under the tongue 3 times daily as needed for Nausea or Vomiting 21 tablet 0    fluticasone (FLONASE) 50 MCG/ACT nasal spray 1 spray by Each Nostril route daily 16 g 0     No current facility-administered medications on file prior to visit. Review of Systems   Constitutional:  Negative for chills and fever. HENT:  Negative for congestion, rhinorrhea and sore throat. Respiratory:  Negative for cough, shortness of breath and wheezing. Cardiovascular:  Negative for chest pain. Gastrointestinal:  Negative for abdominal pain, diarrhea, nausea and vomiting. Musculoskeletal:  Negative for back pain and myalgias. Skin:  Negative for rash. Psychiatric/Behavioral:  The patient is nervous/anxious. Objective    Vitals:    09/07/22 1309 09/07/22 1403   BP: (!) 158/96 118/78   Site: Right Upper Arm    Position: Sitting    Cuff Size: Medium Adult    Pulse: 96    Temp: 98.2 °F (36.8 °C)    TempSrc: Infrared    SpO2: 99%    Weight: 170 lb 3.2 oz (77.2 kg)    Height: 5' 9\" (1.753 m)        Physical Exam  Constitutional:       General: He is not in acute distress. Appearance: Normal appearance. He is not ill-appearing or toxic-appearing. HENT:      Head: Normocephalic and atraumatic. Right Ear: Hearing and external ear normal.      Left Ear: Hearing and external ear normal.   Eyes:      General: Lids are normal.      Conjunctiva/sclera: Conjunctivae normal.   Cardiovascular:      Rate and Rhythm: Normal rate and regular rhythm. Heart sounds: Normal heart sounds. Pulmonary:      Effort: Pulmonary effort is normal. No respiratory distress. Breath sounds: Normal breath sounds. No decreased breath sounds, wheezing, rhonchi or rales.    Musculoskeletal:      Cervical back: Normal range of motion and neck supple. Skin:     General: Skin is warm and dry. Neurological:      General: No focal deficit present. Mental Status: He is alert and oriented to person, place, and time. Psychiatric:         Attention and Perception: Attention normal.         Mood and Affect: Mood is anxious. Speech: Speech normal.         Behavior: Behavior normal.         Thought Content: Thought content normal.         Cognition and Memory: Cognition normal.         Judgment: Judgment normal.     POC Testing Today: No results found for this visit on 09/07/22. Assessment and Plan    Joan Camacho 40 y.o. male presenting for anxiety     1. Mixed anxiety depressive disorder  - Discussed alaina and the guidelines for using this medication. Declined prescribing this but will trial propranolol and hydroxyzine. - propranolol (INDERAL) 20 MG tablet; Take 1 tablet by mouth 3 times daily  Dispense: 90 tablet; Refill: 3  - hydrOXYzine HCl (ATARAX) 50 MG tablet; Take 1 tablet by mouth nightly  Dispense: 30 tablet; Refill: 0    Procedures:  Unless otherwise noted below, none    Return in about 4 weeks (around 10/5/2022) for follow up, PRN for any acute conditions. Side effects and adverse effects of any medication prescribed today, as well as treatment plan/rationale, follow-up care, and result expectations have been discussed with the patient. Expresses understanding and desires to proceed with treatment plan. The patient was reminded that if an antibiotic has been prescribed the predicted course is improvement to cure with no persistent issues. Take antibiotics as directed. If any problems occur, an appointment should be made or ER visit if severe. Because of the risk with ANY antibiotic of C. Difficile colitis if persistent diarrhea or abdominal pain or any concerning symptoms, we should be notified.   To reduce this risk, a probiotic pill, yogurt or other preparations containing active cultures should be ingested daily -particularly while on the antibiotic. If any persistent symptoms of illness, follow up appointment should be made in a timely fashion with a physician. Discussed signs and symptoms which require immediate follow-up in ED/call to 911. Understanding verbalized. I have reviewed and updated the electronic medical record.     Farida Briones, ZAY - CNP

## 2022-09-16 ASSESSMENT — ENCOUNTER SYMPTOMS
ABDOMINAL PAIN: 0
NAUSEA: 0
COUGH: 0
DIARRHEA: 0
SHORTNESS OF BREATH: 0
WHEEZING: 0
RHINORRHEA: 0
BACK PAIN: 0
VOMITING: 0
SORE THROAT: 0

## 2022-10-14 DIAGNOSIS — F41.8 MIXED ANXIETY DEPRESSIVE DISORDER: ICD-10-CM

## 2022-10-14 NOTE — TELEPHONE ENCOUNTER
Comments:     Last Office Visit (last PCP visit):   9/7/2022    Next Visit Date:  No future appointments. **If hasn't been seen in over a year OR hasn't followed up according to last diabetes/ADHD visit, make appointment for patient before sending refill to provider.     Rx requested:  Requested Prescriptions     Pending Prescriptions Disp Refills    hydrOXYzine HCl (ATARAX) 50 MG tablet [Pharmacy Med Name: HYDROXYZINE HCL 50 MG TABLET] 30 tablet 0     Sig: take 1 tablet by mouth nightly

## 2022-10-17 RX ORDER — HYDROXYZINE 50 MG/1
50 TABLET, FILM COATED ORAL NIGHTLY
Qty: 30 TABLET | Refills: 0 | Status: SHIPPED | OUTPATIENT
Start: 2022-10-17 | End: 2022-11-16

## 2022-11-22 DIAGNOSIS — F41.8 MIXED ANXIETY DEPRESSIVE DISORDER: ICD-10-CM

## 2022-11-23 RX ORDER — HYDROXYZINE 50 MG/1
50 TABLET, FILM COATED ORAL NIGHTLY
Qty: 30 TABLET | Refills: 0 | Status: SHIPPED | OUTPATIENT
Start: 2022-11-23 | End: 2022-12-23

## 2023-01-03 NOTE — TELEPHONE ENCOUNTER
Per verbal conversation with Dr. Marc Olmedo out of office. Patient having issues post cisternogram with neck pain and daily migraine and vertigo. Patient advised to start medrol dose pack with goal to decrease inflamation. Advised to drink lots of fluids and bedrest x 2 days. Advised to report to ER if any worse or fever develops. Patient states he did have night sweats over night but no fever that he is aware of. Rx requested:  Requested Prescriptions     Pending Prescriptions Disp Refills    methylPREDNISolone (MEDROL DOSEPACK) 4 MG tablet 1 kit 0     Sig: Take by mouth as directed.          Last Office Visit:   Visit date not found      Next Visit Date:  Future Appointments   Date Time Provider [facility]   2/25/2022  1:30 PM Alban Thorpe, [address] [place] [address]   3/14/2022 11:30 AM Donal Kaiser MD [facility] good balance

## 2023-02-01 ENCOUNTER — TELEPHONE (OUTPATIENT)
Dept: FAMILY MEDICINE CLINIC | Age: 43
End: 2023-02-01

## 2023-02-01 NOTE — TELEPHONE ENCOUNTER
Patient asking for different vertigo medication as the previous ones are not working. Patient is also asking for a letter excusing him from work because he has been out of work. He states that he has been out 3-6 months. He states just in case for court, bills etc. He just wants it to states that he cannot work due to vertigo. Has an appt on Friday.

## 2023-02-03 ENCOUNTER — OFFICE VISIT (OUTPATIENT)
Dept: FAMILY MEDICINE CLINIC | Age: 43
End: 2023-02-03
Payer: COMMERCIAL

## 2023-02-03 VITALS
OXYGEN SATURATION: 98 % | WEIGHT: 179.6 LBS | SYSTOLIC BLOOD PRESSURE: 122 MMHG | HEART RATE: 97 BPM | DIASTOLIC BLOOD PRESSURE: 80 MMHG | HEIGHT: 69 IN | BODY MASS INDEX: 26.6 KG/M2

## 2023-02-03 DIAGNOSIS — R42 DIZZINESS: Primary | ICD-10-CM

## 2023-02-03 PROCEDURE — G8427 DOCREV CUR MEDS BY ELIG CLIN: HCPCS | Performed by: NURSE PRACTITIONER

## 2023-02-03 PROCEDURE — G8484 FLU IMMUNIZE NO ADMIN: HCPCS | Performed by: NURSE PRACTITIONER

## 2023-02-03 PROCEDURE — G8419 CALC BMI OUT NRM PARAM NOF/U: HCPCS | Performed by: NURSE PRACTITIONER

## 2023-02-03 PROCEDURE — 4004F PT TOBACCO SCREEN RCVD TLK: CPT | Performed by: NURSE PRACTITIONER

## 2023-02-03 PROCEDURE — 99214 OFFICE O/P EST MOD 30 MIN: CPT | Performed by: NURSE PRACTITIONER

## 2023-02-03 RX ORDER — HYDROXYZINE 50 MG/1
50 TABLET, FILM COATED ORAL NIGHTLY PRN
COMMUNITY

## 2023-02-03 SDOH — ECONOMIC STABILITY: HOUSING INSECURITY
IN THE LAST 12 MONTHS, WAS THERE A TIME WHEN YOU DID NOT HAVE A STEADY PLACE TO SLEEP OR SLEPT IN A SHELTER (INCLUDING NOW)?: PATIENT REFUSED

## 2023-02-03 SDOH — ECONOMIC STABILITY: FOOD INSECURITY: WITHIN THE PAST 12 MONTHS, THE FOOD YOU BOUGHT JUST DIDN'T LAST AND YOU DIDN'T HAVE MONEY TO GET MORE.: PATIENT DECLINED

## 2023-02-03 SDOH — ECONOMIC STABILITY: FOOD INSECURITY: WITHIN THE PAST 12 MONTHS, YOU WORRIED THAT YOUR FOOD WOULD RUN OUT BEFORE YOU GOT MONEY TO BUY MORE.: PATIENT DECLINED

## 2023-02-03 SDOH — ECONOMIC STABILITY: INCOME INSECURITY: HOW HARD IS IT FOR YOU TO PAY FOR THE VERY BASICS LIKE FOOD, HOUSING, MEDICAL CARE, AND HEATING?: PATIENT DECLINED

## 2023-02-03 ASSESSMENT — PATIENT HEALTH QUESTIONNAIRE - PHQ9
3. TROUBLE FALLING OR STAYING ASLEEP: 0
SUM OF ALL RESPONSES TO PHQ9 QUESTIONS 1 & 2: 0
2. FEELING DOWN, DEPRESSED OR HOPELESS: 0
4. FEELING TIRED OR HAVING LITTLE ENERGY: 0
SUM OF ALL RESPONSES TO PHQ QUESTIONS 1-9: 0
8. MOVING OR SPEAKING SO SLOWLY THAT OTHER PEOPLE COULD HAVE NOTICED. OR THE OPPOSITE, BEING SO FIGETY OR RESTLESS THAT YOU HAVE BEEN MOVING AROUND A LOT MORE THAN USUAL: 0
10. IF YOU CHECKED OFF ANY PROBLEMS, HOW DIFFICULT HAVE THESE PROBLEMS MADE IT FOR YOU TO DO YOUR WORK, TAKE CARE OF THINGS AT HOME, OR GET ALONG WITH OTHER PEOPLE: 0
SUM OF ALL RESPONSES TO PHQ QUESTIONS 1-9: 0
6. FEELING BAD ABOUT YOURSELF - OR THAT YOU ARE A FAILURE OR HAVE LET YOURSELF OR YOUR FAMILY DOWN: 0
SUM OF ALL RESPONSES TO PHQ QUESTIONS 1-9: 0
5. POOR APPETITE OR OVEREATING: 0
9. THOUGHTS THAT YOU WOULD BE BETTER OFF DEAD, OR OF HURTING YOURSELF: 0
SUM OF ALL RESPONSES TO PHQ QUESTIONS 1-9: 0
1. LITTLE INTEREST OR PLEASURE IN DOING THINGS: 0
7. TROUBLE CONCENTRATING ON THINGS, SUCH AS READING THE NEWSPAPER OR WATCHING TELEVISION: 0

## 2023-02-03 NOTE — PROGRESS NOTES
[address] [facility] [address] [facility] PRIMARY CARE  [id number]  Dept: [phone]  Dept Fax: [phone]  Loc: [phone]     Corry Camacho 40 y.o. male presents 2/3/23 with   Chief Complaint   Patient presents with    Dizziness     Feels relief for a day or 2 then it hits him \"like a brick\". Meclizine without relief in the past.    Nausea       HPI    Patient presents for follow up for vertigo. States out of nowhere his body starts to overheat, gets dizzy, vertigo \"kicks in\", has to sit down so he does not fall over. He has been taking hydroxyzine and propranolol, feels that this is helpful. Patient gets shaky after waking up in the mornings. Still smoking marijuana around once a month. Still smoking cigarettes but has cut back a lot, smokes around 1 pack every 2 days. He is requesting a note that states he has been off the past 3-6 months due to his vertigo, in case he has to go to court.     Reviewed the following history:    Past Medical History:   Diagnosis Date    Benign paroxysmal positional vertigo 12/2/2021    Comment on above: VERTIGO    External hemorrhoids     Gastritis without bleeding 1/27/2022    Gastroesophageal reflux disease 12/2/2021    Mixed anxiety depressive disorder 12/2/2021    Polycystic kidney disease 10/27/2021    Vertigo      Past Surgical History:   Procedure Laterality Date    COLONOSCOPY N/A 1/12/2022    COLONOSCOPY DIAGNOSTIC performed by Dayron De Leon MD at [address] [place] [address] ENDOSCOPY N/A 1/12/2022    EGD DIAGNOSTIC ONLY performed by Dayron De Leon MD at [facility]     Family History   Problem Relation Age of Onset    Alcohol Abuse Father     Heart Attack Maternal Aunt     Diabetes Paternal Uncle        Allergies   Allergen Reactions    Penicillins Anaphylaxis and Nausea And Vomiting Current Outpatient Medications on File Prior to Visit   Medication Sig Dispense Refill    hydrOXYzine HCl (ATARAX) 50 MG tablet Take 50 mg by mouth nightly as needed for Itching      propranolol (INDERAL) 20 MG tablet Take 1 tablet by mouth 3 times daily 90 tablet 3    ondansetron (ZOFRAN-ODT) 4 MG disintegrating tablet Place 1 tablet under the tongue 3 times daily as needed for Nausea or Vomiting 21 tablet 0    fluticasone (FLONASE) 50 MCG/ACT nasal spray 1 spray by Each Nostril route daily 16 g 0     No current facility-administered medications on file prior to visit. Review of Systems   Constitutional:  Negative for chills and fever. HENT:  Negative for congestion, rhinorrhea and sore throat. Respiratory:  Negative for cough, shortness of breath and wheezing. Cardiovascular:  Negative for chest pain. Gastrointestinal:  Negative for abdominal pain, diarrhea, nausea and vomiting. Musculoskeletal:  Negative for back pain and myalgias. Skin:  Negative for rash. Objective    Vitals:    02/03/23 1322   BP: 122/80   Site: Right Upper Arm   Position: Sitting   Cuff Size: Medium Adult   Pulse: 97   SpO2: 98%   Weight: 179 lb 9.6 oz (81.5 kg)   Height: 5' 9\" (1.753 m)       Physical Exam  Constitutional:       General: He is not in acute distress. Appearance: Normal appearance. He is not ill-appearing or toxic-appearing. HENT:      Head: Normocephalic and atraumatic. Right Ear: Hearing and external ear normal.      Left Ear: Hearing and external ear normal.   Eyes:      General: Lids are normal.      Conjunctiva/sclera: Conjunctivae normal.   Cardiovascular:      Rate and Rhythm: Normal rate and regular rhythm. Heart sounds: Normal heart sounds. Pulmonary:      Effort: Pulmonary effort is normal. No respiratory distress. Breath sounds: Normal breath sounds. No decreased breath sounds, wheezing, rhonchi or rales.    Musculoskeletal:      Cervical back: Normal range of motion and neck supple. Skin:     General: Skin is warm and dry. Neurological:      General: No focal deficit present. Mental Status: He is alert and oriented to person, place, and time. Psychiatric:         Attention and Perception: Attention normal.         Mood and Affect: Mood normal.         Speech: Speech normal.         Behavior: Behavior normal.         Thought Content: Thought content normal.         Cognition and Memory: Cognition normal.         Judgment: Judgment normal.     POC Testing Today: No results found for this visit on 02/03/23. Assessment and Plan    Nicholas Camacho 40 y.o. male presenting for dizziness     1. Dizziness  - Discussed that I could not provide a note for missing work for the past 3-6 months.  - Holter Monitor 48 Hour; Future  - ECHO 2D WO Color Doppler Complete; Future  - Ambulatory referral to Cardiology  - Ambulatory referral to Neurology    Procedures:  Unless otherwise noted below, none    Return if symptoms worsen or fail to improve, for follow up. Side effects and adverse effects of any medication prescribed today, as well as treatment plan/rationale, follow-up care, and result expectations have been discussed with the patient. Expresses understanding and desires to proceed with treatment plan. The patient was reminded that if an antibiotic has been prescribed the predicted course is improvement to cure with no persistent issues. Take antibiotics as directed. If any problems occur, an appointment should be made or ER visit if severe. Because of the risk with ANY antibiotic of C. Difficile colitis if persistent diarrhea or abdominal pain or any concerning symptoms, we should be notified. To reduce this risk, a probiotic pill, yogurt or other preparations containing active cultures should be ingested daily -particularly while on the antibiotic.  If any persistent symptoms of illness, follow up appointment should be made in a timely fashion with a physician. Discussed signs and symptoms which require immediate follow-up in ED/call to 911. Understanding verbalized. I have reviewed and updated the electronic medical record.     ZAY Vera - CNP

## 2023-02-08 ASSESSMENT — ENCOUNTER SYMPTOMS
NAUSEA: 0
SORE THROAT: 0
WHEEZING: 0
BACK PAIN: 0
DIARRHEA: 0
RHINORRHEA: 0
COUGH: 0
SHORTNESS OF BREATH: 0
VOMITING: 0
ABDOMINAL PAIN: 0

## 2024-10-03 NOTE — TELEPHONE ENCOUNTER
Comments:     Last Office Visit (last PCP visit):   9/7/2022    Next Visit Date:  No future appointments. **If hasn't been seen in over a year OR hasn't followed up according to last diabetes/ADHD visit, make appointment for patient before sending refill to provider.     Rx requested:  Requested Prescriptions     Pending Prescriptions Disp Refills    hydrOXYzine HCl (ATARAX) 50 MG tablet [Pharmacy Med Name: HYDROXYZINE HCL 50 MG TABLET] 30 tablet 0     Sig: take 1 tablet by mouth nightly chest pain

## (undated) DEVICE — SINGLE PORT MANIFOLD: Brand: NEPTUNE 2

## (undated) DEVICE — BRUSH ENDO CLN L90.5IN SHTH DIA1.7MM BRIST DIA5-7MM 2-6MM

## (undated) DEVICE — TUBE SET 96 MM 64 MM H2O PERISTALTIC STD AUX CHANNEL

## (undated) DEVICE — TUBING, SUCTION, 1/4" X 10', STRAIGHT: Brand: MEDLINE

## (undated) DEVICE — GLOVE ORANGE PI 8 1/2   MSG9085

## (undated) DEVICE — Device: Brand: ENDO SMARTCAP

## (undated) DEVICE — PATIENT RETURN ELECTRODE, SINGLE-USE, NON CONTACT QUALITY MONITORING, ADULT, WITH 9 FT (2.7 M) CORD, FOR PATIENTS WEIGHING OVER 33LBS. (15KG): Brand: MEGADYNE

## (undated) DEVICE — FORCEPS BX L240CM JAW DIA2.8MM L CAP W/ NDL MIC MESH TOOTH

## (undated) DEVICE — SNARE ENDOSCP AD L240CM LOOP W10MM SHTH DIA2.4MM RND INSUL

## (undated) DEVICE — ENDO CARRY-ON PROCEDURE KIT: Brand: ENDO CARRY-ON PROCEDURE KIT

## (undated) DEVICE — TRAP POLYP BALEEN

## (undated) DEVICE — CONMED SCOPE SAVER BITE BLOCK, 20X27 MM: Brand: SCOPE SAVER